# Patient Record
Sex: FEMALE | Race: WHITE | NOT HISPANIC OR LATINO | URBAN - METROPOLITAN AREA
[De-identification: names, ages, dates, MRNs, and addresses within clinical notes are randomized per-mention and may not be internally consistent; named-entity substitution may affect disease eponyms.]

---

## 2023-09-30 PROBLEM — N94.6 DYSMENORRHEA: Status: ACTIVE | Noted: 2023-09-30

## 2023-09-30 PROBLEM — E03.9 HYPOTHYROID: Status: ACTIVE | Noted: 2023-09-30

## 2023-09-30 PROBLEM — N96 HISTORY OF RECURRENT MISCARRIAGES: Status: ACTIVE | Noted: 2023-09-30

## 2023-09-30 PROBLEM — G89.29 CHRONIC ARM PAIN: Status: ACTIVE | Noted: 2023-09-30

## 2023-09-30 PROBLEM — O09.299: Status: ACTIVE | Noted: 2023-09-30

## 2023-09-30 PROBLEM — M79.603 CHRONIC ARM PAIN: Status: ACTIVE | Noted: 2023-09-30

## 2023-09-30 RX ORDER — LEVOTHYROXINE SODIUM 50 UG/1
50 TABLET ORAL DAILY
COMMUNITY
End: 2023-12-01 | Stop reason: SDUPTHER

## 2023-10-04 ENCOUNTER — ALLIED HEALTH (OUTPATIENT)
Dept: INTEGRATIVE MEDICINE | Facility: CLINIC | Age: 38
End: 2023-10-04

## 2023-10-04 PROCEDURE — MASS60G MASSAGE 60 MINUTES: Performed by: MASSAGE THERAPIST

## 2023-10-04 NOTE — PROGRESS NOTES
Massage Therapy Visit:     Elizabeth Barnes was self-referred.    Condition of Client Subjective :  Patient ID: Elizabeth Barnes is a 38 y.o. female who presents for reason for visit of shoulder discomfort and relaxation massage.  HPI    Session Information  Visit Type: Follow-up visit        Review of Systems    Objective        Physical Exam    Actions Assessment/Plan :    Massage Treatment  Patient Position: Table, Supine  Positioning Assistance: Did not need assistance, Pillow(s)/bolster under knees while supine  Massage Technique: Cranio-sacral therapy, Myofascial release, Relaxation massage, Stretching, Therapeutic massage  Area/Body Region: Whole body  Pressure Scale: 4 - Moderate pressure  Action Note: *discomfort indicating to R shoulder, R biceps.  Relaxation massage requested for Stress Management.                                       50 minute full body massage, supine.   Medium/Firm pressure.  Therapeutic pressure. MFR techniques.  Relaxation protocol.    Supine:  Cervical/upper body, kneading, stripping, palming, effleurage, petrissage, cross fiber, on Platysma, SCM, SITS, Levator Scapulae, Trapezius, Rhomboids, upper Pectoralis, Deltoids.  * Traction on cervicals, rhomboids, trapezius.                               UE, stretching, cross fiber, palming, kneading, effleurage, on Deltoids, Biceps, Triceps, forearm muscles, hand muscles.             LE, kneading, stripping, palming, knuckle, effleurage, petrissage, cross fiber, stretching, on Vastus group, TFL, lateral approach to Hip Flexors, Hamstrings, Sartorius, Gracilis, Gastrocnemius, Soleus, Tibialis, plantar/dorsal aspect of foot.                             Lateral approach while supine, kneading, stripping, palming, knuckle, effleurage, petrissage, cross fiber, stretching, on erectors, Longissimus, QL, Oblique, paraspinals.                                                                                                                                        Follow up scheduled, Increase hydration.    Response:       Evaluation:

## 2023-10-12 ENCOUNTER — ALLIED HEALTH (OUTPATIENT)
Dept: INTEGRATIVE MEDICINE | Facility: CLINIC | Age: 38
End: 2023-10-12
Payer: COMMERCIAL

## 2023-10-12 DIAGNOSIS — M54.2 NECK PAIN: ICD-10-CM

## 2023-10-12 DIAGNOSIS — M79.601 CHRONIC PAIN OF RIGHT UPPER EXTREMITY: Primary | ICD-10-CM

## 2023-10-12 DIAGNOSIS — G89.29 CHRONIC PAIN OF RIGHT UPPER EXTREMITY: Primary | ICD-10-CM

## 2023-10-12 PROCEDURE — 97811 ACUP 1/> W/O ESTIM EA ADD 15: CPT | Performed by: ACUPUNCTURIST

## 2023-10-12 PROCEDURE — 97810 ACUP 1/> WO ESTIM 1ST 15 MIN: CPT | Performed by: ACUPUNCTURIST

## 2023-10-12 NOTE — PROGRESS NOTES
Acupuncture Visit:     Subjective   Patient ID: Elizabeth Barnes is a 38 y.o. female who presents for Shoulder Pain, Neck Pain, Stress, Poor Sleep, and Digestive Complaints  Stress has been high this week and sleep has been limited due to baby waking so she is not feeling well.     Chronic right shoulder pain:  she is getting increased shoulder pain in the delotoid.  She gets sharp pain.  Perhaps from holding the baby more. Not the same type of pain that was in the bicep tendon in the past.     neck pain: neck pain has been improving.  Feels tight from stress but no pain.   headaches: no headaches.         Session Information  Is this acupuncture treatment being billed to the patient's insurance company: Yes  Name of Insurance Company: [employer]  Visit Type: Follow-up visit  Medical History Reviewed: I have reviewed pertinent medical history in EHR, and no contraindications are present to provide treatment         Review of Systems  Sleep: not getting enoguh sleep due to baby waking often. She is really tired.  Digestion: she is going back and forth with loose stool       Provider reviewed plan for the acupuncture session, precautions and contraindications. Patient/guardian/hospital staff has given consent to treat with full understanding of what to expect during the session. Before acupuncture began, provider explained to the patient to communicate at any time if the procedure was causing discomfort past their tolerance level. Patient agreed to advise acupuncturist. The acupuncturist counseled the patient on the risks of acupuncture treatment including pain, infection, bleeding, and no relief of pain. The patient was positioned comfortably. There was no evidence of infection at the site of needle insertions.    Objective   Physical Exam         Treatment Plan  Pattern Differentiation: Liver Qi stagnation with qi and blood stasis    Acupuncture Treatment  Patient Position: Supine on a table  Acupuncture Needling:  Yes  Needle Guage: 36 guage /.20/ Blue seirin  Body Points: With retention  Body Points - Bilateral: Du 20, yintang, SP 15, ST 25, R 6, R 12, ST 36, SP 6, KD 3, DANIAL 3  Body Points - Right: LI 15, SJ 14  Auricular Points: Yes  Auricular Points - Bilateral: zero  Other Techniques Utilized: TDP Lamp, Ear seeds  Ear Seeds: Stainless steel (shenmen)  TDP Lamp Descripton: feet and abdomen  Needle Count In: 20  Needle Count Out: 20  Needle Retention Time (min): 25 minutes  Total Face to Face Time (min): 25 minutes              Assessment/Plan   Diagnoses and all orders for this visit:  Chronic pain of right upper extremity  Neck pain

## 2023-10-19 ENCOUNTER — ALLIED HEALTH (OUTPATIENT)
Dept: INTEGRATIVE MEDICINE | Facility: CLINIC | Age: 38
End: 2023-10-19
Payer: COMMERCIAL

## 2023-10-19 DIAGNOSIS — M54.2 NECK PAIN: Primary | ICD-10-CM

## 2023-10-19 DIAGNOSIS — G89.29 CHRONIC PAIN OF RIGHT UPPER EXTREMITY: ICD-10-CM

## 2023-10-19 DIAGNOSIS — M79.601 CHRONIC PAIN OF RIGHT UPPER EXTREMITY: ICD-10-CM

## 2023-10-19 PROCEDURE — 97810 ACUP 1/> WO ESTIM 1ST 15 MIN: CPT | Performed by: ACUPUNCTURIST

## 2023-10-19 PROCEDURE — 97811 ACUP 1/> W/O ESTIM EA ADD 15: CPT | Performed by: ACUPUNCTURIST

## 2023-10-19 NOTE — PROGRESS NOTES
Acupuncture Visit:     Subjective   Patient ID: Elizabeth Barnes is a 38 y.o. female who presents for Neck Pain, Shoulder Pain, and Stress    Stress remains high but she is working on managing her stress better.    Chronic right shoulder pain:  she is not using her shoulder as much and doing a little better with rest.  She has been feeling chest tightness and that affects her shoulder pain in a negative way.     neck pain: neck is continuing to improve and doing well.   headaches: no headaches.         Session Information  Is this acupuncture treatment being billed to the patient's insurance company: Yes  Name of Insurance Company: [employer]  Visit Type: Follow-up visit  Medical History Reviewed: I have reviewed pertinent medical history in EHR, and no contraindications are present to provide treatment         Review of Systems  Sleep: still struggling with baby's sleep and this causes poor sleep for her  Digestion: less constipation  Stress: high       Provider reviewed plan for the acupuncture session, precautions and contraindications. Patient/guardian/hospital staff has given consent to treat with full understanding of what to expect during the session. Before acupuncture began, provider explained to the patient to communicate at any time if the procedure was causing discomfort past their tolerance level. Patient agreed to advise acupuncturist. The acupuncturist counseled the patient on the risks of acupuncture treatment including pain, infection, bleeding, and no relief of pain. The patient was positioned comfortably. There was no evidence of infection at the site of needle insertions.    Objective   Physical Exam              Acupuncture Treatment  Patient Position: Supine on a table  Acupuncture Needling: Yes  Needle Guage: 36 guage /.20/ Blue seirin  Body Points: With retention  Body Points - Bilateral: Du 20, yintang, LI 4, SJ 5, R 12, SP 15, R 6, KD 25, FRED 1, ST 36, SP 6, DANIAL 3  Auricular Points:  Yes  Auricular Points - Bilateral: zero  Other Techniques Utilized: Ear seeds, TDP Lamp  Ear Seeds: Stainless steel (shenmen)  TDP Lamp Descripton: feet and abdomen  Needle Count In: 22  Needle Count Out: 22  Needle Retention Time (min): 25 minutes  Total Face to Face Time (min): 25 minutes              Assessment/Plan   Diagnoses and all orders for this visit:  Neck pain  Chronic pain of right upper extremity

## 2023-10-27 ENCOUNTER — APPOINTMENT (OUTPATIENT)
Dept: INTEGRATIVE MEDICINE | Facility: CLINIC | Age: 38
End: 2023-10-27
Payer: COMMERCIAL

## 2023-11-03 ENCOUNTER — ALLIED HEALTH (OUTPATIENT)
Dept: INTEGRATIVE MEDICINE | Facility: CLINIC | Age: 38
End: 2023-11-03
Payer: COMMERCIAL

## 2023-11-03 DIAGNOSIS — M79.601 CHRONIC PAIN OF RIGHT UPPER EXTREMITY: ICD-10-CM

## 2023-11-03 DIAGNOSIS — M54.2 NECK PAIN: Primary | ICD-10-CM

## 2023-11-03 DIAGNOSIS — G89.29 CHRONIC PAIN OF RIGHT UPPER EXTREMITY: ICD-10-CM

## 2023-11-03 PROCEDURE — 97811 ACUP 1/> W/O ESTIM EA ADD 15: CPT | Performed by: ACUPUNCTURIST

## 2023-11-03 PROCEDURE — 97810 ACUP 1/> WO ESTIM 1ST 15 MIN: CPT | Performed by: ACUPUNCTURIST

## 2023-11-03 NOTE — PROGRESS NOTES
Acupuncture Visit:     Subjective   Patient ID: Elizabeth Barnes is a 38 y.o. female who presents for Neck Pain, Headache, Shoulder Pain, and Stress    Chronic right shoulder pain:  continuing to work on stretching her chest muscles.  This seems to help the shoulder.  She is having some pain in the right shoulder and arm.     neck pain: good this week. No issues.   headaches: no headaches.     LMP 11/2/23  First period since baby.  Some cramping and really painful ovulation this month.  Bleeding is moderate.  PMS: no noticeable symptoms    Stress moderate        Session Information  Is this acupuncture treatment being billed to the patient's insurance company: Yes  Last Treatment date: 10/19/23  Name of Insurance Company: [employer]  Visit Type: Follow-up visit  Medical History Reviewed: I have reviewed pertinent medical history in EHR, and no contraindications are present to provide treatment         Review of Systems  Sleep: still struggling with baby's sleep and this causes poor sleep for her  Digestion: better  Stress: high       Provider reviewed plan for the acupuncture session, precautions and contraindications. Patient/guardian/hospital staff has given consent to treat with full understanding of what to expect during the session. Before acupuncture began, provider explained to the patient to communicate at any time if the procedure was causing discomfort past their tolerance level. Patient agreed to advise acupuncturist. The acupuncturist counseled the patient on the risks of acupuncture treatment including pain, infection, bleeding, and no relief of pain. The patient was positioned comfortably. There was no evidence of infection at the site of needle insertions.    Objective   Physical Exam              Acupuncture Treatment  Patient Position: Supine on a table  Acupuncture Needling: Yes  Needle Guage: 36 guage /.20/ Blue seirin  Body Points: With retention  Body Points - Bilateral: DU 20, P 6, GB 21, FRED 1, R  12, ST 25, R 6, zigong, ST 36, Sp 6, DANIAL 3  Body Points - Right: LI 14, SJ 14, LI 15  Auricular Points: No  Electroacupuncture Used: No  Other Techniques Utilized: Ear seeds, TDP Lamp  Ear Seeds: Stainless steel (shenmen)  TDP Lamp Descripton: feet and abdomen  Needle Count In: 22  Needle Count Out: 22  Needle Retention Time (min): 25 minutes  Total Face to Face Time (min): 25 minutes              Assessment/Plan   Diagnoses and all orders for this visit:  Neck pain  Chronic pain of right upper extremity

## 2023-11-10 ENCOUNTER — ALLIED HEALTH (OUTPATIENT)
Dept: INTEGRATIVE MEDICINE | Facility: CLINIC | Age: 38
End: 2023-11-10
Payer: COMMERCIAL

## 2023-11-10 DIAGNOSIS — M79.601 CHRONIC PAIN OF RIGHT UPPER EXTREMITY: ICD-10-CM

## 2023-11-10 DIAGNOSIS — G89.29 CHRONIC PAIN OF RIGHT UPPER EXTREMITY: ICD-10-CM

## 2023-11-10 DIAGNOSIS — M54.2 NECK PAIN: Primary | ICD-10-CM

## 2023-11-10 PROCEDURE — 97810 ACUP 1/> WO ESTIM 1ST 15 MIN: CPT | Performed by: ACUPUNCTURIST

## 2023-11-10 PROCEDURE — 97811 ACUP 1/> W/O ESTIM EA ADD 15: CPT | Performed by: ACUPUNCTURIST

## 2023-11-17 ENCOUNTER — ALLIED HEALTH (OUTPATIENT)
Dept: INTEGRATIVE MEDICINE | Facility: CLINIC | Age: 38
End: 2023-11-17
Payer: COMMERCIAL

## 2023-11-17 DIAGNOSIS — M54.2 NECK PAIN: Primary | ICD-10-CM

## 2023-11-17 DIAGNOSIS — M79.601 CHRONIC PAIN OF RIGHT UPPER EXTREMITY: ICD-10-CM

## 2023-11-17 DIAGNOSIS — G89.29 CHRONIC PAIN OF RIGHT UPPER EXTREMITY: ICD-10-CM

## 2023-11-17 PROCEDURE — 97810 ACUP 1/> WO ESTIM 1ST 15 MIN: CPT | Performed by: ACUPUNCTURIST

## 2023-11-17 PROCEDURE — 97811 ACUP 1/> W/O ESTIM EA ADD 15: CPT | Performed by: ACUPUNCTURIST

## 2023-11-17 NOTE — PROGRESS NOTES
Acupuncture Visit:     Subjective   Patient ID: Elizabeth Barnes is a 38 y.o. female who presents for Neck Pain, Shoulder Pain, and Headache    Chronic right shoulder pain:  she is not using her shoulder as much and doing a little better with rest.  She has been feeling chest tightness and that affects her shoulder pain in a negative way.  She is working on stretching.    neck pain: good. Pain is under control.  headaches: no headaches.           Session Information  Is this acupuncture treatment being billed to the patient's insurance company: Yes  Last Treatment date: 11/10/23  Name of Insurance Company: [employer]  Visit Type: Follow-up visit  Medical History Reviewed: I have reviewed pertinent medical history in EHR, and no contraindications are present to provide treatment         Review of Systems  Sleep: she slept well this week.   Digestion: better  Stress: moderate overall.  Acupuncture helps significantly with her anxiety  LMP 11/2/23       Provider reviewed plan for the acupuncture session, precautions and contraindications. Patient/guardian/hospital staff has given consent to treat with full understanding of what to expect during the session. Before acupuncture began, provider explained to the patient to communicate at any time if the procedure was causing discomfort past their tolerance level. Patient agreed to advise acupuncturist. The acupuncturist counseled the patient on the risks of acupuncture treatment including pain, infection, bleeding, and no relief of pain. The patient was positioned comfortably. There was no evidence of infection at the site of needle insertions.    Objective   Physical Exam              Acupuncture Treatment  Patient Position: Supine on a table  Acupuncture Needling: Yes  Needle Guage: 36 guage /.20/ Blue seirin  Body Points: With retention  Body Points - Bilateral: Du 20, LI 4, R 12, ST 25, R 6, ST 36, SP 6, KD 3, FRED 1  Body Points - Right: SJ 14, LI 15, LI 14  Auricular  Points: No  Other Techniques Utilized: Ear seeds, TDP Lamp  Ear Seeds: Stainless steel (shenmen)  TDP Lamp Descripton: feet and abdomen  Needle Count In: 18  Needle Count Out: 18  Needle Retention Time (min): 25 minutes  Total Face to Face Time (min): 25 minutes              Assessment/Plan   Diagnoses and all orders for this visit:  Neck pain  Chronic pain of right upper extremity

## 2023-11-24 ENCOUNTER — PATIENT MESSAGE (OUTPATIENT)
Dept: ENDOCRINOLOGY | Facility: CLINIC | Age: 38
End: 2023-11-24
Payer: COMMERCIAL

## 2023-11-24 DIAGNOSIS — E03.9 HYPOTHYROIDISM, UNSPECIFIED TYPE: Primary | ICD-10-CM

## 2023-12-01 ENCOUNTER — ALLIED HEALTH (OUTPATIENT)
Dept: INTEGRATIVE MEDICINE | Facility: CLINIC | Age: 38
End: 2023-12-01
Payer: COMMERCIAL

## 2023-12-01 DIAGNOSIS — J06.9 VIRAL UPPER RESPIRATORY TRACT INFECTION: ICD-10-CM

## 2023-12-01 DIAGNOSIS — M54.2 NECK PAIN: Primary | ICD-10-CM

## 2023-12-01 DIAGNOSIS — M79.601 CHRONIC PAIN OF RIGHT UPPER EXTREMITY: ICD-10-CM

## 2023-12-01 DIAGNOSIS — G89.29 CHRONIC PAIN OF RIGHT UPPER EXTREMITY: ICD-10-CM

## 2023-12-01 PROCEDURE — 97810 ACUP 1/> WO ESTIM 1ST 15 MIN: CPT | Performed by: ACUPUNCTURIST

## 2023-12-01 PROCEDURE — 97811 ACUP 1/> W/O ESTIM EA ADD 15: CPT | Performed by: ACUPUNCTURIST

## 2023-12-01 RX ORDER — LEVOTHYROXINE SODIUM 50 UG/1
TABLET ORAL
Qty: 105 TABLET | Refills: 3 | Status: SHIPPED | OUTPATIENT
Start: 2023-12-01

## 2023-12-01 NOTE — PROGRESS NOTES
Acupuncture Visit:     Subjective   Patient ID: Elizabeth Barnes is a 38 y.o. female who presents for Shoulder Pain, Neck Pain, Hip Pain, and URI    Chronic right shoulder pain:  she has been resting this week with a URI and not using her shoulder as much.  She is achy in general from the illness and notices this pain in the right shoulder and neck    Neck pain: she is very sore in her neck this week.  Increased pain brought on by her URI.    URI: post nasal drip, mild cough, body aches, no fever, negative COVID test. Onset two days ago.  It has been getting a little worse.  No headaches but body aches all over.     Cardiology appointment: EKG was normal and she retested thyroid and her TSH was 4.  She is scheduled for an ECHO at the end of the month and she is increasing her thyroid medication and they will test her thyroid antibodies.        Session Information  Is this acupuncture treatment being billed to the patient's insurance company: Yes  Last Treatment date: 11/17/23  Name of Insurance Company: [employer]  Visit Type: Follow-up visit  Medical History Reviewed: I have reviewed pertinent medical history in EHR, and no contraindications are present to provide treatment         Review of Systems  Sleep: not as good due to her and her son being sick  Digestion: doing well.   Stress: manageable.  Gyne:  11/2/23  LMP 11/28/23  She had some cramping but it was okay and flow was normal.        Provider reviewed plan for the acupuncture session, precautions and contraindications. Patient/guardian/hospital staff has given consent to treat with full understanding of what to expect during the session. Before acupuncture began, provider explained to the patient to communicate at any time if the procedure was causing discomfort past their tolerance level. Patient agreed to advise acupuncturist. The acupuncturist counseled the patient on the risks of acupuncture treatment including pain, infection, bleeding, and no relief  of pain. The patient was positioned comfortably. There was no evidence of infection at the site of needle insertions.    Objective   Physical Exam              Acupuncture Treatment  Patient Position: Supine on a table  Acupuncture Needling: Yes  Needle Guage: 36 guage /.20/ Blue seirin, 40 guage /.16/ Red seirin  Body Points: With retention  Body Points - Bilateral: Du 23, yintang, GB 21, LI 4, FRED 7, SJ 5, FRED 1, ST 36, SP 9, ST 40  Other Techniques Utilized: Ear seeds, TDP Lamp  Ear Seeds: Stainless steel (shenmen)  TDP Lamp Descripton: feet  Needle Count In: 18  Needle Count Out: 18  Needle Retention Time (min): 25 minutes  Total Face to Face Time (min): 25 minutes              Assessment/Plan   Diagnoses and all orders for this visit:  Neck pain  Chronic pain of right upper extremity  Viral upper respiratory tract infection       (2) very limited

## 2023-12-10 DIAGNOSIS — E03.9 HYPOTHYROIDISM, UNSPECIFIED TYPE: ICD-10-CM

## 2023-12-12 RX ORDER — LEVOTHYROXINE SODIUM 50 UG/1
TABLET ORAL
Qty: 90 TABLET | OUTPATIENT
Start: 2023-12-12

## 2023-12-14 NOTE — PROGRESS NOTES
Acupuncture Visit:     Subjective   Patient ID: Elizabeth Barnes is a 38 y.o. female who presents for Shoulder Pain, Neck Pain, and Headache    Chronic right shoulder pain: pain has been mild in the right shoulder and arm this week. Continuing to work on stretching and strength.     neck pain: increased pain in her neck and upper back this week with tightness  headaches: no headaches.         Session Information  Is this acupuncture treatment being billed to the patient's insurance company: Yes  Last Treatment date: 11/03/23  Name of Insurance Company: [employer]  Visit Type: Follow-up visit  Medical History Reviewed: I have reviewed pertinent medical history in EHR, and no contraindications are present to provide treatment         Review of Systems  Sleep: still struggling with baby's sleep and this causes poor sleep for her  Digestion: good  Stress: moderate  Gyne: LMP 11/2/23         Provider reviewed plan for the acupuncture session, precautions and contraindications. Patient/guardian/hospital staff has given consent to treat with full understanding of what to expect during the session. Before acupuncture began, provider explained to the patient to communicate at any time if the procedure was causing discomfort past their tolerance level. Patient agreed to advise acupuncturist. The acupuncturist counseled the patient on the risks of acupuncture treatment including pain, infection, bleeding, and no relief of pain. The patient was positioned comfortably. There was no evidence of infection at the site of needle insertions.    Objective   Physical Exam              Acupuncture Treatment  Patient Position: Prone on a table  Acupuncture Needling: Yes  Needle Guage: 36 guage /.20/ Blue seirin  Body Points: With retention  Body Points - Bilateral: GB 20, UB 10, GB 21, rhomboid nikos, UB 18, UB 20, UB 23, P 6, GB 34, SP 6  Auricular Points: No  Other Techniques Utilized: Ear seeds, TDP Lamp, Cupping  Cupping Description:  running cupping on upper back  Ear Seeds: Stainless steel (shenmen)  TDP Lamp Descripton: upper back  Needle Count In: 20  Needle Count Out: 20  Needle Retention Time (min): 25 minutes  Total Face to Face Time (min): 25 minutes              Assessment/Plan   Diagnoses and all orders for this visit:  Neck pain  Chronic pain of right upper extremity

## 2023-12-15 ENCOUNTER — ALLIED HEALTH (OUTPATIENT)
Dept: INTEGRATIVE MEDICINE | Facility: CLINIC | Age: 38
End: 2023-12-15
Payer: COMMERCIAL

## 2023-12-15 DIAGNOSIS — M79.601 CHRONIC PAIN OF RIGHT UPPER EXTREMITY: ICD-10-CM

## 2023-12-15 DIAGNOSIS — M54.2 NECK PAIN: Primary | ICD-10-CM

## 2023-12-15 DIAGNOSIS — G89.29 CHRONIC PAIN OF RIGHT UPPER EXTREMITY: ICD-10-CM

## 2023-12-15 DIAGNOSIS — J06.9 VIRAL UPPER RESPIRATORY TRACT INFECTION: ICD-10-CM

## 2023-12-15 DIAGNOSIS — M79.641 RIGHT HAND PAIN: ICD-10-CM

## 2023-12-15 PROCEDURE — 97810 ACUP 1/> WO ESTIM 1ST 15 MIN: CPT | Performed by: ACUPUNCTURIST

## 2023-12-15 PROCEDURE — 97811 ACUP 1/> W/O ESTIM EA ADD 15: CPT | Performed by: ACUPUNCTURIST

## 2023-12-15 NOTE — PROGRESS NOTES
Acupuncture Visit:     Subjective   Patient ID: Elizabeth Barnes is a 38 y.o. female who presents for Neck Pain, Shoulder Pain, Hand Pain, and URI    Chronic right shoulder pain:  she has been sick with a URI for several weeks and resting during that time so she is having less pain overall in her chronic shoulder pain.  Mild soreness with certain movements.     (R) finger pain: she smashed her 2nd, 3rd, and 4th distal part of her fingers on the right hand in a garage door last week.  She had significant pain, swelling and bruising.  She went to the ER and there was no bone break.  She is feeling strange nerve sensations and mild pain and mild swelling now.     Neck pain: continuing to see improvement in the neck pain and tension.  She thinks rest has been helpful.     URI: she has a new URI with stuffy nose, no sore throat, residual cough from last cold, no headaches, no fever, no body aches.     Cardiology appointment: EKG was normal and she retested thyroid and her TSH was 4.  She is scheduled for an ECHO at the end of the month and she is increasing her thyroid medication and they will test her thyroid antibodies. ECHO on 12/28.     Breastfeeding: pumping 3 times per day and breastmilk is going down.          Session Information  Is this acupuncture treatment being billed to the patient's insurance company: Yes  Last Treatment date: 12/01/23  Name of Insurance Company: [employer]  Visit Type: Follow-up visit  Medical History Reviewed: I have reviewed pertinent medical history in EHR, and no contraindications are present to provide treatment         Review of Systems  Sleep: not great.   Digestion: doing well.   Stress: higher.  Gyne:  11/2/23  LMP 11/28/23 nothing yet.        Provider reviewed plan for the acupuncture session, precautions and contraindications. Patient/guardian/hospital staff has given consent to treat with full understanding of what to expect during the session. Before acupuncture began,  provider explained to the patient to communicate at any time if the procedure was causing discomfort past their tolerance level. Patient agreed to advise acupuncturist. The acupuncturist counseled the patient on the risks of acupuncture treatment including pain, infection, bleeding, and no relief of pain. The patient was positioned comfortably. There was no evidence of infection at the site of needle insertions.    Objective   Physical Exam              Acupuncture Treatment  Patient Position: Supine on a table  Acupuncture Needling: Yes  Needle Guage: 34 guage /.22/ Pink seirin, 40 guage /.16/ Red seirin  Body Points: With retention  Body Points - Bilateral: Du 23, yintang, LI 20, LI 4, SJ 5, FRED 7, ST 36, SP 9, ST 40  Body Points - Right: baxie, LI 14, SJ 14, LI 15  Auricular Points: No  Other Techniques Utilized: Ear seeds, TDP Lamp  Ear Seeds: Stainless steel (shenmen)  TDP Lamp Descripton: feet  Needle Count In: 22  Needle Count Out: 22  Needle Retention Time (min): 25 minutes  Total Face to Face Time (min): 25 minutes              Assessment/Plan   Diagnoses and all orders for this visit:  Neck pain  Chronic pain of right upper extremity  Viral upper respiratory tract infection  Right hand pain

## 2023-12-22 ENCOUNTER — ALLIED HEALTH (OUTPATIENT)
Dept: INTEGRATIVE MEDICINE | Facility: CLINIC | Age: 38
End: 2023-12-22
Payer: COMMERCIAL

## 2023-12-22 DIAGNOSIS — M79.641 RIGHT HAND PAIN: ICD-10-CM

## 2023-12-22 DIAGNOSIS — M54.2 NECK PAIN: Primary | ICD-10-CM

## 2023-12-22 DIAGNOSIS — J06.9 VIRAL UPPER RESPIRATORY TRACT INFECTION: ICD-10-CM

## 2023-12-22 DIAGNOSIS — M79.601 CHRONIC PAIN OF RIGHT UPPER EXTREMITY: ICD-10-CM

## 2023-12-22 DIAGNOSIS — G89.29 CHRONIC PAIN OF RIGHT UPPER EXTREMITY: ICD-10-CM

## 2023-12-22 PROCEDURE — 97811 ACUP 1/> W/O ESTIM EA ADD 15: CPT | Performed by: ACUPUNCTURIST

## 2023-12-22 PROCEDURE — 97810 ACUP 1/> WO ESTIM 1ST 15 MIN: CPT | Performed by: ACUPUNCTURIST

## 2023-12-22 NOTE — PROGRESS NOTES
Acupuncture Visit:     Subjective   Patient ID: Elizabeth Barnes is a 38 y.o. female who presents for Shoulder Pain, Hand Pain, and Neck Pain    Chronic right shoulder pain:  sore with certain movements but overall doing well because she has been resting with her cold.     (R) finger pain: she smashed her 2nd, 3rd, and 4th distal part of her fingers on the right hand in a garage door 2 weeks ago.  Healing well.  Getting feeling back and pain is decreasing.     Neck pain: some soreness and tightness in the neck and upper back because she 8 month old daughter is needing more holding and rocking right now.     URI: resolving URI with no fatigue or body aches but still some sinus congestion and post nasal drip.    Cardiology appointment: EKG was normal and she retested thyroid and her TSH was 4.  She is scheduled for an ECHO at the end of the month and she is increasing her thyroid medication and they will test her thyroid antibodies. ECHO on 12/28.     Breastfeeding: pumping 3 times per day and breastmilk is going down.          Session Information  Is this acupuncture treatment being billed to the patient's insurance company: Yes  Last Treatment date: 12/15/23  Name of Insurance Company: [employer]  Visit Type: Follow-up visit  Medical History Reviewed: I have reviewed pertinent medical history in EHR, and no contraindications are present to provide treatment         Review of Systems  Sleep: not great due to little ones  Digestion: doing well.   Stress: high but getting ready for holiday break  Gyne:  11/2/23  LMP 11/28/23 nothing yet.        Provider reviewed plan for the acupuncture session, precautions and contraindications. Patient/guardian/hospital staff has given consent to treat with full understanding of what to expect during the session. Before acupuncture began, provider explained to the patient to communicate at any time if the procedure was causing discomfort past their tolerance level. Patient agreed to  advise acupuncturist. The acupuncturist counseled the patient on the risks of acupuncture treatment including pain, infection, bleeding, and no relief of pain. The patient was positioned comfortably. There was no evidence of infection at the site of needle insertions.    Objective   Physical Exam              Acupuncture Treatment  Patient Position: Supine on a table  Acupuncture Needling: Yes  Needle Guage: 34 guage /.22/ Pink seirin, 40 guage /.16/ Red seirin  Body Points: With retention  Body Points - Bilateral: Du 20, yintang, LI 20, GB 21, LI 4, SJ 5, LI 11, St 36, SP 9, ST 40, Sp 6, KD 3 (No Liver 3)  Auricular Points: No  Other Techniques Utilized: Ear seeds, TDP Lamp  Ear Seeds: Stainless steel (shenmen)  TDP Lamp Descripton: feet  Needle Count In: 22  Needle Count Out: 22  Needle Retention Time (min): 25 minutes  Total Face to Face Time (min): 25 minutes              Assessment/Plan   Diagnoses and all orders for this visit:  Neck pain  Chronic pain of right upper extremity  Right hand pain  Viral upper respiratory tract infection

## 2024-01-09 ENCOUNTER — APPOINTMENT (OUTPATIENT)
Dept: INTEGRATIVE MEDICINE | Facility: CLINIC | Age: 39
End: 2024-01-09
Payer: COMMERCIAL

## 2024-01-19 ENCOUNTER — ALLIED HEALTH (OUTPATIENT)
Dept: INTEGRATIVE MEDICINE | Facility: CLINIC | Age: 39
End: 2024-01-19
Payer: COMMERCIAL

## 2024-01-19 DIAGNOSIS — G89.29 CHRONIC PAIN OF RIGHT UPPER EXTREMITY: ICD-10-CM

## 2024-01-19 DIAGNOSIS — M54.2 NECK PAIN: Primary | ICD-10-CM

## 2024-01-19 DIAGNOSIS — M79.601 CHRONIC PAIN OF RIGHT UPPER EXTREMITY: ICD-10-CM

## 2024-01-19 PROCEDURE — 97810 ACUP 1/> WO ESTIM 1ST 15 MIN: CPT | Performed by: ACUPUNCTURIST

## 2024-01-19 PROCEDURE — 97811 ACUP 1/> W/O ESTIM EA ADD 15: CPT | Performed by: ACUPUNCTURIST

## 2024-01-26 ENCOUNTER — ALLIED HEALTH (OUTPATIENT)
Dept: INTEGRATIVE MEDICINE | Facility: CLINIC | Age: 39
End: 2024-01-26
Payer: COMMERCIAL

## 2024-01-26 DIAGNOSIS — M79.601 CHRONIC PAIN OF RIGHT UPPER EXTREMITY: ICD-10-CM

## 2024-01-26 DIAGNOSIS — G89.29 CHRONIC PAIN OF RIGHT UPPER EXTREMITY: ICD-10-CM

## 2024-01-26 DIAGNOSIS — M54.9 UPPER BACK PAIN ON RIGHT SIDE: ICD-10-CM

## 2024-01-26 DIAGNOSIS — M54.2 NECK PAIN: Primary | ICD-10-CM

## 2024-01-26 PROCEDURE — 97811 ACUP 1/> W/O ESTIM EA ADD 15: CPT | Performed by: ACUPUNCTURIST

## 2024-01-26 PROCEDURE — 97810 ACUP 1/> WO ESTIM 1ST 15 MIN: CPT | Performed by: ACUPUNCTURIST

## 2024-01-26 NOTE — PROGRESS NOTES
Acupuncture Visit:     Subjective   Patient ID: Elizabeth Barnes is a 39 y.o. female who presents for Neck Pain and Back Pain    Neck pain and Chronic right shoulder/upper back pain: still having a lot of pain in the right side of her neck and trap.  Pain is above the right scapula.     (R) finger pain: still tenderness but no nerve issues.     Cardiology appointment: EKG was normal and ECHO was normal results. She needs to talk with her doctor about the results.     Breastfeeding: pumping twice per day and supply low.         Session Information  Is this acupuncture treatment being billed to the patient's insurance company: Yes  Last Treatment date: 01/19/24  Name of Insurance Company: [place]  Visit Type: Follow-up visit  Medical History Reviewed: I have reviewed pertinent medical history in EHR, and no contraindications are present to provide treatment         Review of Systems  Sleep: better after acupuncture  Digestion: doing good.   Stress: moderate now.   Gyne: LMP 1/17/24 Doing well now.        Provider reviewed plan for the acupuncture session, precautions and contraindications. Patient/guardian/hospital staff has given consent to treat with full understanding of what to expect during the session. Before acupuncture began, provider explained to the patient to communicate at any time if the procedure was causing discomfort past their tolerance level. Patient agreed to advise acupuncturist. The acupuncturist counseled the patient on the risks of acupuncture treatment including pain, infection, bleeding, and no relief of pain. The patient was positioned comfortably. There was no evidence of infection at the site of needle insertions.    Objective   Physical Exam              Acupuncture Treatment  Patient Position: Prone on a table  Acupuncture Needling: Yes  Needle Guage: 36 guage /.20/ Blue seirin  Body Points: With retention  Body Points - Bilateral: GB 20, UB 10, GB 21, rhomboid nikos x3, GB 34  Body Points  - Right: SI 11, SJ 15, SI 13  Auricular Points: No  Electroacupuncture Used: No  Other Techniques Utilized: Ear seeds, TDP Lamp, Cupping  Cupping Description: running cupping on upper back with massage oil  Ear Seeds: Stainless steel (shenmen)  TDP Lamp Descripton: feet  Needle Count In: 17  Needle Count Out: 17  Needle Retention Time (min): 25 minutes  Total Face to Face Time (min): 25 minutes              Assessment/Plan   Diagnoses and all orders for this visit:  Neck pain  Chronic pain of right upper extremity  Upper back pain on right side

## 2024-01-30 ENCOUNTER — ALLIED HEALTH (OUTPATIENT)
Dept: INTEGRATIVE MEDICINE | Facility: CLINIC | Age: 39
End: 2024-01-30

## 2024-01-30 PROCEDURE — MASSG60 MASSAGE 60 MINUTES: Performed by: MASSAGE THERAPIST

## 2024-01-30 PROCEDURE — CYTAX SALES TAX CUYAHOGA COUNT: Performed by: MASSAGE THERAPIST

## 2024-01-30 NOTE — PROGRESS NOTES
Massage Therapy Visit:     Elizabeth Barnes was self-referred.    Condition of Client Subjective :  Patient ID: Elizabeth Barnes is a 39 y.o. female who presents for reason for visit of Muscle tension relief.  HPI    Session Information  Visit Type: Follow-up visit  Description of present complaint: Muscle tension, Discomfort, Stress, Range of motion (ROM)    Review of Systems    Objective   Pre-treatment Assessment  Condition of Client Note: Requests focus on neck, shoulder.    Physical Exam    Actions Assessment/Plan :    Massage Treatment  Patient Position: Table, Supine, Prone  Positioning Assistance: Did not need assistance, Pillow(s)/bolster under anles while prone, Pillow(s)/bolster under knees while supine  Massage Technique: Myofascial release, Nurturing touch, Mobilization, Stretching, Therapeutic massage, Relaxation massage  Pressure Scale: 2 - Mild pressure, 3 - Medium pressure, 4 - Moderate pressure  Action Note: 50 minute full body massage, supine/prone.  /Medium/Firm pressure.  Therapeutic pressure. MFR techniques.  Relaxation protocol.    Supine:  Cervical/upper body, kneading, stripping, palming, effleurage, petrissage, cross fiber, on SCM, SITS, Levator Scapulae, Trapezius, Rhomboids, upper Pectoralis, Deltoids.  UE, stretching, cross fiber, palming, kneading, effleurage, on Deltoids, Biceps, Triceps, forearm muscles, hand muscles.  LE, kneading, stripping, palming, knuckle, effleurage, petrissage, cross fiber, stretching, on Vastus group, TFL, lateral approach to Hip Flexors, Hamstrings, Sartorius, Gracilis, Gastrocnemius, Soleus, Tibialis, plantar/dorsal aspect of foot.  Prone, kneading, stripping, palming, knuckle, effleurage, petrissage, cross fiber, stretching, on SI joint, erectors, Longissimus, QL, Oblique, paraspinals, SITS.    Response:  Post-treatment Assessment  Patient Fell Asleep During Treatment: No  Patient Noted Improvement of the Following Symptoms: Muscle tension,  ROM    Evaluation:   Massage Therapy Evaluation / Recommendation(s) / Follow-up  Post-Treatment Recommendation: Increase hydration  Follow-up: Follow up as needed.

## 2024-02-02 ENCOUNTER — APPOINTMENT (OUTPATIENT)
Dept: INTEGRATIVE MEDICINE | Facility: CLINIC | Age: 39
End: 2024-02-02
Payer: COMMERCIAL

## 2024-02-08 ENCOUNTER — ALLIED HEALTH (OUTPATIENT)
Dept: INTEGRATIVE MEDICINE | Facility: CLINIC | Age: 39
End: 2024-02-08
Payer: COMMERCIAL

## 2024-02-08 DIAGNOSIS — G89.29 CHRONIC PAIN OF RIGHT UPPER EXTREMITY: ICD-10-CM

## 2024-02-08 DIAGNOSIS — M54.2 NECK PAIN: Primary | ICD-10-CM

## 2024-02-08 DIAGNOSIS — M79.601 CHRONIC PAIN OF RIGHT UPPER EXTREMITY: ICD-10-CM

## 2024-02-08 PROCEDURE — 97811 ACUP 1/> W/O ESTIM EA ADD 15: CPT | Performed by: ACUPUNCTURIST

## 2024-02-08 PROCEDURE — 97810 ACUP 1/> WO ESTIM 1ST 15 MIN: CPT | Performed by: ACUPUNCTURIST

## 2024-02-08 NOTE — PROGRESS NOTES
Acupuncture Visit:     Subjective   Patient ID: Elizabeth Barnes is a 39 y.o. female who presents for Neck Pain, Back Pain, Immune Support, and Stress    Her son has been sick with strep all week so she has not been getting as much sleep and caring for him and hoping not to get sick herself.     Neck pain and Chronic right shoulder/upper back pain: tension and pain in the neck and upper back from stress and caring for children.     Cardiology appointment: EKG was normal and ECHO was normal results. She needs to talk with her doctor about the results.     Breastfeeding: pumping twice per day and supply low.         Session Information  Is this acupuncture treatment being billed to the patient's insurance company: Yes  Last Treatment date: 01/26/24  Name of Insurance Company: [employer]  Visit Type: Follow-up visit  Medical History Reviewed: I have reviewed pertinent medical history in EHR, and no contraindications are present to provide treatment         Review of Systems  Sleep: better after acupuncture  Digestion: doing good.   Stress: high now.   Gyne: LMP 1/17/24 Bad ovulation pain.  She is expecting period in about 10 days.        Provider reviewed plan for the acupuncture session, precautions and contraindications. Patient/guardian/hospital staff has given consent to treat with full understanding of what to expect during the session. Before acupuncture began, provider explained to the patient to communicate at any time if the procedure was causing discomfort past their tolerance level. Patient agreed to advise acupuncturist. The acupuncturist counseled the patient on the risks of acupuncture treatment including pain, infection, bleeding, and no relief of pain. The patient was positioned comfortably. There was no evidence of infection at the site of needle insertions.    Objective   Physical Exam              Acupuncture Treatment  Patient Position: Supine on a table  Acupuncture Needling: Yes  Needle Guage: 36  guage /.20/ Blue irin  Body Points: With retention  Body Points - Bilateral: Du 20, yintang, LI 11, SJ 5, ST 36, SP 9, Sp 6, R 12, ST 25, R 6  Auricular Points: No  Electroacupuncture Used: No  Other Techniques Utilized: Ear seeds, TDP Lamp  Ear Seeds: Stainless steel (shenmen)  TDP Lamp Descripton: feet and abdomen  Needle Count In: 16  Needle Count Out: 16  Needle Retention Time (min): 25 minutes  Total Face to Face Time (min): 25 minutes              Assessment/Plan   Diagnoses and all orders for this visit:  Neck pain  Chronic pain of right upper extremity

## 2024-02-09 ENCOUNTER — APPOINTMENT (OUTPATIENT)
Dept: INTEGRATIVE MEDICINE | Facility: CLINIC | Age: 39
End: 2024-02-09
Payer: COMMERCIAL

## 2024-02-15 ENCOUNTER — APPOINTMENT (OUTPATIENT)
Dept: INTEGRATIVE MEDICINE | Facility: CLINIC | Age: 39
End: 2024-02-15
Payer: COMMERCIAL

## 2024-02-16 ENCOUNTER — APPOINTMENT (OUTPATIENT)
Dept: INTEGRATIVE MEDICINE | Facility: CLINIC | Age: 39
End: 2024-02-16
Payer: COMMERCIAL

## 2024-02-23 ENCOUNTER — ALLIED HEALTH (OUTPATIENT)
Dept: INTEGRATIVE MEDICINE | Facility: CLINIC | Age: 39
End: 2024-02-23
Payer: COMMERCIAL

## 2024-02-23 DIAGNOSIS — M54.2 NECK PAIN: Primary | ICD-10-CM

## 2024-02-23 DIAGNOSIS — G89.29 CHRONIC PAIN OF RIGHT UPPER EXTREMITY: ICD-10-CM

## 2024-02-23 DIAGNOSIS — M79.601 CHRONIC PAIN OF RIGHT UPPER EXTREMITY: ICD-10-CM

## 2024-02-23 DIAGNOSIS — J02.9 SORE THROAT: ICD-10-CM

## 2024-02-23 DIAGNOSIS — M54.9 UPPER BACK PAIN ON RIGHT SIDE: ICD-10-CM

## 2024-02-23 PROCEDURE — 97810 ACUP 1/> WO ESTIM 1ST 15 MIN: CPT | Performed by: ACUPUNCTURIST

## 2024-02-23 PROCEDURE — 97811 ACUP 1/> W/O ESTIM EA ADD 15: CPT | Performed by: ACUPUNCTURIST

## 2024-02-23 NOTE — PROGRESS NOTES
Acupuncture Visit:     Subjective   Patient ID: Elizabeth Barnes is a 39 y.o. female who presents for Neck Pain, Shoulder Pain, and Stress    Neck pain and Chronic right shoulder/upper back pain: overall she is doing better with her neck and upper back after her massage recently and working on posture. She would like cupping today.     Sore throat: She still has a sore throat that won't go away for the past two weeks. She has tested negative for strep, covid, and flu.  She feels that things are improving. She also thinks there is some post nasal drip. Congestion is clear.         Session Information  Is this acupuncture treatment being billed to the patient's insurance company: Yes  Last Treatment date: 02/08/24  Name of Insurance Company: [employer]  Visit Type: Follow-up visit  Medical History Reviewed: I have reviewed pertinent medical history in EHR, and no contraindications are present to provide treatment         Review of Systems  Sleep: better with daughter sleeping better.   Digestion: doing good.   Stress: high  Gyne: She is around ovulation right now.  Cardiology: no concerns there - no palpitations recently.        Provider reviewed plan for the acupuncture session, precautions and contraindications. Patient/guardian/hospital staff has given consent to treat with full understanding of what to expect during the session. Before acupuncture began, provider explained to the patient to communicate at any time if the procedure was causing discomfort past their tolerance level. Patient agreed to advise acupuncturist. The acupuncturist counseled the patient on the risks of acupuncture treatment including pain, infection, bleeding, and no relief of pain. The patient was positioned comfortably. There was no evidence of infection at the site of needle insertions.    Objective   Physical Exam              Acupuncture Treatment  Patient Position: Supine on a table  Acupuncture Needling: Yes  Needle Guage: 36 guage /.20/  Blue erasto  Body Points: With retention  Body Points - Bilateral: Du 20, LI 11, SJ 5, FRED 10, R 12, ST 25, R 6, ST 36, SP 6  Auricular Points: No  Electroacupuncture Used: No  Other Techniques Utilized: Ear seeds, TDP Lamp, Cupping  Cupping Description: running cupping on upper back  Ear Seeds: Stainless steel (shenmen)  TDP Lamp Descripton: feet and abdomen  Needle Count In: 15  Needle Count Out: 15  Needle Retention Time (min): 25 minutes  Total Face to Face Time (min): 25 minutes              Assessment/Plan   Diagnoses and all orders for this visit:  Neck pain  Chronic pain of right upper extremity  Upper back pain on right side  Sore throat

## 2024-03-06 ENCOUNTER — ALLIED HEALTH (OUTPATIENT)
Dept: INTEGRATIVE MEDICINE | Facility: CLINIC | Age: 39
End: 2024-03-06
Payer: COMMERCIAL

## 2024-03-06 DIAGNOSIS — M54.2 NECK PAIN: Primary | ICD-10-CM

## 2024-03-06 DIAGNOSIS — J02.9 SORE THROAT: ICD-10-CM

## 2024-03-06 DIAGNOSIS — G89.29 CHRONIC PAIN OF RIGHT UPPER EXTREMITY: ICD-10-CM

## 2024-03-06 DIAGNOSIS — M79.601 CHRONIC PAIN OF RIGHT UPPER EXTREMITY: ICD-10-CM

## 2024-03-06 PROCEDURE — 97811 ACUP 1/> W/O ESTIM EA ADD 15: CPT | Performed by: ACUPUNCTURIST

## 2024-03-06 PROCEDURE — 97810 ACUP 1/> WO ESTIM 1ST 15 MIN: CPT | Performed by: ACUPUNCTURIST

## 2024-03-06 NOTE — PROGRESS NOTES
Acupuncture Visit:     Subjective   Patient ID: Elizabeth Barnes is a 39 y.o. female who presents for Neck Pain, Shoulder Pain, and URI    Neck pain and Chronic right shoulder/upper back pain: she felt the cupping was helpful.  Less pain and tension in her neck and back and shoulder for several days after cupping.  Feeling tension and mild discomfort now.    Sore throat: She still has a lingering sore throat that she has had for a month. Her energy is good.  Feeling a little dizzy.         Session Information  Is this acupuncture treatment being billed to the patient's insurance company: Yes  Last Treatment date: 02/23/24  Name of Insurance Company: [place]  Visit Type: Follow-up visit  Medical History Reviewed: I have reviewed pertinent medical history in EHR, and no contraindications are present to provide treatment         Review of Systems  Sleep: doing well.   Digestion: tending toward constipation  Stress: moderate to high  Gyne: LMP 3/6/24 Period started today. She has cramping.       Provider reviewed plan for the acupuncture session, precautions and contraindications. Patient/guardian/hospital staff has given consent to treat with full understanding of what to expect during the session. Before acupuncture began, provider explained to the patient to communicate at any time if the procedure was causing discomfort past their tolerance level. Patient agreed to advise acupuncturist. The acupuncturist counseled the patient on the risks of acupuncture treatment including pain, infection, bleeding, and no relief of pain. The patient was positioned comfortably. There was no evidence of infection at the site of needle insertions.    Objective   Physical Exam              Acupuncture Treatment  Patient Position: Supine on a table  Acupuncture Needling: Yes  Needle Guage: 40 guage /.16/ Red seirin  Body Points: With retention  Body Points - Bilateral: yintang, Du 20, LI 4, LI 11, FRED 7, GB 21, ST 36, R 12, SP 15, R  6  Auricular Points: No  Electroacupuncture Used: No  Other Techniques Utilized: Ear seeds, TDP Lamp, Cupping  Cupping Description: running cupping on upper back  Ear Seeds: Stainless steel (shenmen)  TDP Lamp Descripton: feet and abdomen  Needle Count In: 16  Needle Count Out: 16  Needle Retention Time (min): 25 minutes  Total Face to Face Time (min): 25 minutes              Assessment/Plan   Diagnoses and all orders for this visit:  Neck pain  Chronic pain of right upper extremity  Sore throat

## 2024-03-14 ENCOUNTER — ALLIED HEALTH (OUTPATIENT)
Dept: INTEGRATIVE MEDICINE | Facility: CLINIC | Age: 39
End: 2024-03-14
Payer: COMMERCIAL

## 2024-03-14 DIAGNOSIS — J06.9 VIRAL UPPER RESPIRATORY TRACT INFECTION: ICD-10-CM

## 2024-03-14 DIAGNOSIS — R51.9 NONINTRACTABLE HEADACHE, UNSPECIFIED CHRONICITY PATTERN, UNSPECIFIED HEADACHE TYPE: ICD-10-CM

## 2024-03-14 DIAGNOSIS — M54.2 NECK PAIN: Primary | ICD-10-CM

## 2024-03-14 PROCEDURE — 97810 ACUP 1/> WO ESTIM 1ST 15 MIN: CPT | Performed by: ACUPUNCTURIST

## 2024-03-14 PROCEDURE — 97811 ACUP 1/> W/O ESTIM EA ADD 15: CPT | Performed by: ACUPUNCTURIST

## 2024-03-14 NOTE — PROGRESS NOTES
Acupuncture Visit:     Subjective   Patient ID: Elizabeth Barnes is a 39 y.o. female who presents for Neck Pain, URI, and Headache    Neck pain and Chronic right shoulder/upper back pain: some tension and discomfort in the neck.  Her right shoulder is doing well currently.     Headache: she is having a headache and facial pain this week from sinus congestion and URI.  Her sore throat finally resolved and then she got sick again and is having some post nasal drip and throat irritation.        Session Information  Is this acupuncture treatment being billed to the patient's insurance company: Yes  Last Treatment date: 03/06/24  Name of Insurance Company: [employer]  Visit Type: Follow-up visit  Medical History Reviewed: I have reviewed pertinent medical history in EHR, and no contraindications are present to provide treatment         Review of Systems  Sleep: doing well.   Digestion: no issues this week  Stress: moderate to high  Gyne: LMP 3/6/24        Provider reviewed plan for the acupuncture session, precautions and contraindications. Patient/guardian/hospital staff has given consent to treat with full understanding of what to expect during the session. Before acupuncture began, provider explained to the patient to communicate at any time if the procedure was causing discomfort past their tolerance level. Patient agreed to advise acupuncturist. The acupuncturist counseled the patient on the risks of acupuncture treatment including pain, infection, bleeding, and no relief of pain. The patient was positioned comfortably. There was no evidence of infection at the site of needle insertions.    Objective   Physical Exam              Acupuncture Treatment  Patient Position: Supine on a table  Acupuncture Needling: Yes  Needle Guage: 40 guage /.16/ Red seirin  Body Points: With retention  Body Points - Bilateral: ST 36, Li 4, SJ 5, LI 11, R 12, ST 25, R 6, R 17, Du 20, yintang, LI 20  Auricular Points: Yes  Auricular Points  - Bilateral: zero  Other Techniques Utilized: Ear seeds, TDP Lamp  Ear Seeds: Stainless steel (shenmen)  TDP Lamp Descripton: feet and abdomen  Needle Count In: 19  Needle Count Out: 19  Needle Retention Time (min): 25 minutes  Total Face to Face Time (min): 25 minutes              Assessment/Plan   Diagnoses and all orders for this visit:  Neck pain  Nonintractable headache, unspecified chronicity pattern, unspecified headache type  Viral upper respiratory tract infection

## 2024-03-22 ENCOUNTER — ALLIED HEALTH (OUTPATIENT)
Dept: INTEGRATIVE MEDICINE | Facility: CLINIC | Age: 39
End: 2024-03-22
Payer: COMMERCIAL

## 2024-03-22 DIAGNOSIS — R51.9 NONINTRACTABLE HEADACHE, UNSPECIFIED CHRONICITY PATTERN, UNSPECIFIED HEADACHE TYPE: ICD-10-CM

## 2024-03-22 DIAGNOSIS — M54.2 NECK PAIN: Primary | ICD-10-CM

## 2024-03-22 DIAGNOSIS — J02.9 SORE THROAT: ICD-10-CM

## 2024-03-22 DIAGNOSIS — J06.9 VIRAL UPPER RESPIRATORY TRACT INFECTION: ICD-10-CM

## 2024-03-22 DIAGNOSIS — M79.601 CHRONIC PAIN OF RIGHT UPPER EXTREMITY: ICD-10-CM

## 2024-03-22 DIAGNOSIS — G89.29 CHRONIC PAIN OF RIGHT UPPER EXTREMITY: ICD-10-CM

## 2024-03-22 PROCEDURE — 97811 ACUP 1/> W/O ESTIM EA ADD 15: CPT | Performed by: ACUPUNCTURIST

## 2024-03-22 PROCEDURE — 97810 ACUP 1/> WO ESTIM 1ST 15 MIN: CPT | Performed by: ACUPUNCTURIST

## 2024-03-22 NOTE — PROGRESS NOTES
Acupuncture Visit:     Subjective   Patient ID: Elizabeth Barnes is a 39 y.o. female who presents for Shoulder Pain, Neck Pain, Headache, and URI    Neck pain and Chronic right shoulder/upper back pain: some neck and upper back pain with her URI.  Shoulder is doing well because she has been resting a lot.     Headache: no headaches this week.     URI: she got another URI this Monday.  She has been having post nasal drip with a sore throat and swollen and sore glands.         Session Information  Is this acupuncture treatment being billed to the patient's insurance company: Yes  Last Treatment date: 03/14/24  Name of Insurance Company: [place]  Visit Type: Follow-up visit  Medical History Reviewed: I have reviewed pertinent medical history in EHR, and no contraindications are present to provide treatment         Review of Systems  Sleep: she slept well   Digestion: doing well. Good even during travel.  Stress: higher because this is her busy time of year.   Gyne: LMP 3/6/24  She ovulated this past weekend.        Provider reviewed plan for the acupuncture session, precautions and contraindications. Patient/guardian/hospital staff has given consent to treat with full understanding of what to expect during the session. Before acupuncture began, provider explained to the patient to communicate at any time if the procedure was causing discomfort past their tolerance level. Patient agreed to advise acupuncturist. The acupuncturist counseled the patient on the risks of acupuncture treatment including pain, infection, bleeding, and no relief of pain. The patient was positioned comfortably. There was no evidence of infection at the site of needle insertions.    Objective   Physical Exam              Acupuncture Treatment  Patient Position: Supine on a table  Acupuncture Needling: Yes  Needle Guage: 36 guage /.20/ Blue seirin  Body Points: With retention  Body Points - Bilateral: Du 20, Du 23, yintang, LI 18, LI 4, SJ 5, FRED  7, GB 21, ST 36  Auricular Points: No  Electroacupuncture Used: No  Other Techniques Utilized: Ear seeds, TDP Lamp  Ear Seeds: Stainless steel  TDP Lamp Descripton: feet and abdomen  Needle Count In: 15  Needle Count Out: 15  Needle Retention Time (min): 25 minutes  Total Face to Face Time (min): 25 minutes              Assessment/Plan   Diagnoses and all orders for this visit:  Neck pain  Chronic pain of right upper extremity  Nonintractable headache, unspecified chronicity pattern, unspecified headache type  Sore throat  Viral upper respiratory tract infection

## 2024-04-05 ENCOUNTER — ALLIED HEALTH (OUTPATIENT)
Dept: INTEGRATIVE MEDICINE | Facility: CLINIC | Age: 39
End: 2024-04-05
Payer: COMMERCIAL

## 2024-04-05 DIAGNOSIS — J06.9 VIRAL UPPER RESPIRATORY TRACT INFECTION: ICD-10-CM

## 2024-04-05 DIAGNOSIS — R51.9 NONINTRACTABLE HEADACHE, UNSPECIFIED CHRONICITY PATTERN, UNSPECIFIED HEADACHE TYPE: ICD-10-CM

## 2024-04-05 DIAGNOSIS — J02.9 SORE THROAT: ICD-10-CM

## 2024-04-05 DIAGNOSIS — M79.601 CHRONIC PAIN OF RIGHT UPPER EXTREMITY: ICD-10-CM

## 2024-04-05 DIAGNOSIS — G89.29 CHRONIC PAIN OF RIGHT UPPER EXTREMITY: ICD-10-CM

## 2024-04-05 DIAGNOSIS — M54.2 NECK PAIN: Primary | ICD-10-CM

## 2024-04-05 PROCEDURE — 97810 ACUP 1/> WO ESTIM 1ST 15 MIN: CPT | Performed by: ACUPUNCTURIST

## 2024-04-05 PROCEDURE — 97811 ACUP 1/> W/O ESTIM EA ADD 15: CPT | Performed by: ACUPUNCTURIST

## 2024-04-05 NOTE — PROGRESS NOTES
Acupuncture Visit:     Subjective   Patient ID: Elizabeth Barnes is a 39 y.o. female who presents for Headache, Neck Pain, and URI    Neck pain and Chronic right shoulder/upper back pain: some tension and discomfort in the neck.  Her right shoulder is doing well currently.     Headache: one headaches this week.     URI: resolving from last week.  Still some sore throat and post nasal drip.    She has been having heart palpitations again. She is also feeling dizzy lately.  She is weaning baby right now and wonders if this is hormone related.         Session Information  Is this acupuncture treatment being billed to the patient's insurance company: Yes  Last Treatment date: 03/22/24  Name of Insurance Company: [place]  Visit Type: Follow-up visit  Medical History Reviewed: I have reviewed pertinent medical history in EHR, and no contraindications are present to provide treatment         Review of Systems  Sleep: she slept well   Digestion: doing well. Good even during travel.  Stress: higher because this is her busy time of year.   Gyne:  3/6/24  LMP 03/28/24 Periods have been painful and long and heavy. She typically had 3 day periods and now 7 days.        Provider reviewed plan for the acupuncture session, precautions and contraindications. Patient/guardian/hospital staff has given consent to treat with full understanding of what to expect during the session. Before acupuncture began, provider explained to the patient to communicate at any time if the procedure was causing discomfort past their tolerance level. Patient agreed to advise acupuncturist. The acupuncturist counseled the patient on the risks of acupuncture treatment including pain, infection, bleeding, and no relief of pain. The patient was positioned comfortably. There was no evidence of infection at the site of needle insertions.    Objective   Physical Exam              Acupuncture Treatment  Patient Position: Supine on a table  Acupuncture  Needling: Yes  Needle Guage: 36 guage /.20/ Blue seirin  Body Points: With retention  Body Points - Bilateral: DU 20, yintang, LI 20, LI 4, SJ 5, FRED 7, LI 11, ST 36  Auricular Points: No  Electroacupuncture Used: No  Other Techniques Utilized: Ear seeds, TDP Lamp, Cupping  Cupping Description: running cupping on upper back with massage oil  Ear Seeds: Stainless steel (shenmen)  TDP Lamp Descripton: feet and abdomen  Needle Count In: 14  Needle Count Out: 14  Needle Retention Time (min): 25 minutes  Total Face to Face Time (min): 25 minutes              Assessment/Plan   Diagnoses and all orders for this visit:  Neck pain  Chronic pain of right upper extremity  Nonintractable headache, unspecified chronicity pattern, unspecified headache type  Viral upper respiratory tract infection  Sore throat

## 2024-04-19 ENCOUNTER — ALLIED HEALTH (OUTPATIENT)
Dept: INTEGRATIVE MEDICINE | Facility: CLINIC | Age: 39
End: 2024-04-19
Payer: COMMERCIAL

## 2024-04-19 DIAGNOSIS — M54.2 NECK PAIN: Primary | ICD-10-CM

## 2024-04-19 DIAGNOSIS — R51.9 NONINTRACTABLE HEADACHE, UNSPECIFIED CHRONICITY PATTERN, UNSPECIFIED HEADACHE TYPE: ICD-10-CM

## 2024-04-19 DIAGNOSIS — J06.9 VIRAL UPPER RESPIRATORY TRACT INFECTION: ICD-10-CM

## 2024-04-19 PROCEDURE — 97811 ACUP 1/> W/O ESTIM EA ADD 15: CPT | Performed by: ACUPUNCTURIST

## 2024-04-19 PROCEDURE — 97810 ACUP 1/> WO ESTIM 1ST 15 MIN: CPT | Performed by: ACUPUNCTURIST

## 2024-04-19 NOTE — PROGRESS NOTES
Acupuncture Visit:     Subjective   Patient ID: Elizabeth Barnes is a 39 y.o. female who presents for Neck Pain, Headache, and URI    Neck pain and Chronic right shoulder/upper back pain: more soreness and tension in the neck and upper back this wee.     Headache: she had another headache once this week.     URI: continuing to feel not 100% yet following her URI    Her palpitations have gotten better.  Not experiencing these recently.         Session Information  Is this acupuncture treatment being billed to the patient's insurance company: Yes  Last Treatment date: 04/05/24  Name of Insurance Company: [employer]  Visit Type: Follow-up visit  Medical History Reviewed: I have reviewed pertinent medical history in EHR, and no contraindications are present to provide treatment         Review of Systems  Sleep: going well.    Digestion: doing better this week.    Stress: higher because her kids are sick  Gyne:  3/6/24  LMP 3/28/24 She had a lot of ovulatory pain on 4/10.  She needed 3 Advil every 6 hours for about 1 day.  She started vitex.        Provider reviewed plan for the acupuncture session, precautions and contraindications. Patient/guardian/hospital staff has given consent to treat with full understanding of what to expect during the session. Before acupuncture began, provider explained to the patient to communicate at any time if the procedure was causing discomfort past their tolerance level. Patient agreed to advise acupuncturist. The acupuncturist counseled the patient on the risks of acupuncture treatment including pain, infection, bleeding, and no relief of pain. The patient was positioned comfortably. There was no evidence of infection at the site of needle insertions.    Objective   Physical Exam              Acupuncture Treatment  Patient Position: Supine on a table  Acupuncture Needling: Yes  Needle Guage: 40 guage /.16/ Red seirin  Body Points: With retention  Body Points - Bilateral: Du 20, yintang,  LI 20, LI 4, FRED 7, SJ 5, LI 11, R 12, ST 25, R 6, ST 36  Auricular Points: No  Electroacupuncture Used: No  Other Techniques Utilized: Ear seeds, TDP Lamp  Ear Seeds: Stainless steel (shenmen)  TDP Lamp Descripton: feet and abdomen  Needle Count In: 18  Needle Count Out: 18  Needle Retention Time (min): 25 minutes  Total Face to Face Time (min): 25 minutes              Assessment/Plan   Diagnoses and all orders for this visit:  Neck pain  Nonintractable headache, unspecified chronicity pattern, unspecified headache type  Viral upper respiratory tract infection

## 2024-04-26 ENCOUNTER — ALLIED HEALTH (OUTPATIENT)
Dept: INTEGRATIVE MEDICINE | Facility: CLINIC | Age: 39
End: 2024-04-26
Payer: COMMERCIAL

## 2024-04-26 DIAGNOSIS — M54.9 UPPER BACK PAIN ON RIGHT SIDE: ICD-10-CM

## 2024-04-26 DIAGNOSIS — N94.6 DYSMENORRHEA: ICD-10-CM

## 2024-04-26 DIAGNOSIS — R51.9 NONINTRACTABLE HEADACHE, UNSPECIFIED CHRONICITY PATTERN, UNSPECIFIED HEADACHE TYPE: ICD-10-CM

## 2024-04-26 DIAGNOSIS — M54.2 NECK PAIN: Primary | ICD-10-CM

## 2024-04-26 PROCEDURE — 97810 ACUP 1/> WO ESTIM 1ST 15 MIN: CPT | Performed by: ACUPUNCTURIST

## 2024-04-26 PROCEDURE — 97811 ACUP 1/> W/O ESTIM EA ADD 15: CPT | Performed by: ACUPUNCTURIST

## 2024-04-26 NOTE — PROGRESS NOTES
Acupuncture Visit:     Subjective   Patient ID: Elizabeth Barnes is a 39 y.o. female who presents for Neck Pain, Back Pain, Headache, and URI    Neck pain and Chronic right shoulder/upper back pain: neck and shoulder have been doing better with regular acupuncture.  Mild tension this week.     Headache: she had a headache with the start of her period.     URI: she feels like things are resolved with her cold. She was traveling this week and trying not to get sick.         Session Information  Is this acupuncture treatment being billed to the patient's insurance company: Yes  Last Treatment date: 04/19/24  Name of Insurance Company: [employer]  Visit Type: Follow-up visit  Medical History Reviewed: I have reviewed pertinent medical history in EHR, and no contraindications are present to provide treatment         Review of Systems  Sleep: going well.    Digestion: doing better this week.    Stress: higher because her kids are sick  Gyne:  LMP 4/22/24  This period was a little better.  Only one really heavy day and the rest were more normal flow.  She is getting cramping and more cramping today and did not need medication.           Provider reviewed plan for the acupuncture session, precautions and contraindications. Patient/guardian/hospital staff has given consent to treat with full understanding of what to expect during the session. Before acupuncture began, provider explained to the patient to communicate at any time if the procedure was causing discomfort past their tolerance level. Patient agreed to advise acupuncturist. The acupuncturist counseled the patient on the risks of acupuncture treatment including pain, infection, bleeding, and no relief of pain. The patient was positioned comfortably. There was no evidence of infection at the site of needle insertions.    Objective   Physical Exam              Acupuncture Treatment  Patient Position: Supine on a table  Acupuncture Needling: Yes  Needle Guage: 40 guage  /.16/ Red seirin  Body Points: With retention  Body Points - Bilateral: Du 20, yintang, R 12, SP 15, R 6, zigong, SP 10, SP 6  Auricular Points: No  Electroacupuncture Used: No  Other Techniques Utilized: Ear seeds, TDP Lamp  Ear Seeds: Stainless steel (shenmen)  TDP Lamp Descripton: feet and abdomen  Needle Count In: 12  Needle Count Out: 12  Needle Retention Time (min): 25 minutes  Total Face to Face Time (min): 25 minutes              Assessment/Plan   Diagnoses and all orders for this visit:  Neck pain  Upper back pain on right side  Nonintractable headache, unspecified chronicity pattern, unspecified headache type  Dysmenorrhea

## 2024-05-03 ENCOUNTER — ALLIED HEALTH (OUTPATIENT)
Dept: INTEGRATIVE MEDICINE | Facility: CLINIC | Age: 39
End: 2024-05-03
Payer: COMMERCIAL

## 2024-05-03 DIAGNOSIS — M54.2 NECK PAIN: Primary | ICD-10-CM

## 2024-05-03 DIAGNOSIS — M54.9 UPPER BACK PAIN ON RIGHT SIDE: ICD-10-CM

## 2024-05-03 DIAGNOSIS — N94.6 DYSMENORRHEA: ICD-10-CM

## 2024-05-03 DIAGNOSIS — R51.9 NONINTRACTABLE HEADACHE, UNSPECIFIED CHRONICITY PATTERN, UNSPECIFIED HEADACHE TYPE: ICD-10-CM

## 2024-05-03 PROCEDURE — 97810 ACUP 1/> WO ESTIM 1ST 15 MIN: CPT | Performed by: ACUPUNCTURIST

## 2024-05-03 PROCEDURE — 97811 ACUP 1/> W/O ESTIM EA ADD 15: CPT | Performed by: ACUPUNCTURIST

## 2024-05-03 NOTE — PROGRESS NOTES
Acupuncture Visit:     Subjective   Patient ID: Elizabeth Barnes is a 39 y.o. female who presents for Neck Pain, Back Pain, and Headache    She has had a really good week.  She feels healthy and her body feels good.     Neck pain and Chronic right shoulder/upper back pain: no issues this week.    Headache: one headache since the last visit.  Some sinus congestion.     Palpitations: palpitations are coming and going.        Session Information  Is this acupuncture treatment being billed to the patient's insurance company: Yes  Last Treatment date: 04/26/24  Name of Insurance Company: [employer]  Visit Type: Follow-up visit  Medical History Reviewed: I have reviewed pertinent medical history in EHR, and no contraindications are present to provide treatment         Review of Systems  Sleep: restless sleep. Not hot and no night sweats.  Waking earlier and then restless after.  Digestion: slight constipation  Stress: a little better this week  Gyne: LMP 4/22/24  She is getting ready to ovulate and feels that her period was better this time.          Provider reviewed plan for the acupuncture session, precautions and contraindications. Patient/guardian/hospital staff has given consent to treat with full understanding of what to expect during the session. Before acupuncture began, provider explained to the patient to communicate at any time if the procedure was causing discomfort past their tolerance level. Patient agreed to advise acupuncturist. The acupuncturist counseled the patient on the risks of acupuncture treatment including pain, infection, bleeding, and no relief of pain. The patient was positioned comfortably. There was no evidence of infection at the site of needle insertions.    Objective   Physical Exam              Acupuncture Treatment  Patient Position: Supine on a table  Acupuncture Needling: Yes  Needle Guage: 36 guage /.20/ Blue seirin  Body Points: With retention  Body Points - Bilateral: Du 20, yintang,  P 6, R 17, R 14, R 12, SP 15, R 6, ST 36  Auricular Points: No  Electroacupuncture Used: No  Other Techniques Utilized: Ear seeds, TDP Lamp  Ear Seeds: Stainless steel (shenmen)  TDP Lamp Descripton: feet and abdomen  Needle Count In: 12  Needle Count Out: 12  Needle Retention Time (min): 25 minutes  Total Face to Face Time (min): 25 minutes              Assessment/Plan   Diagnoses and all orders for this visit:  Neck pain  Upper back pain on right side  Nonintractable headache, unspecified chronicity pattern, unspecified headache type  Dysmenorrhea

## 2024-05-09 ENCOUNTER — ALLIED HEALTH (OUTPATIENT)
Dept: INTEGRATIVE MEDICINE | Facility: CLINIC | Age: 39
End: 2024-05-09
Payer: COMMERCIAL

## 2024-05-09 DIAGNOSIS — R51.9 NONINTRACTABLE HEADACHE, UNSPECIFIED CHRONICITY PATTERN, UNSPECIFIED HEADACHE TYPE: ICD-10-CM

## 2024-05-09 DIAGNOSIS — M25.562 LEFT KNEE PAIN, UNSPECIFIED CHRONICITY: ICD-10-CM

## 2024-05-09 DIAGNOSIS — M54.9 UPPER BACK PAIN ON RIGHT SIDE: ICD-10-CM

## 2024-05-09 DIAGNOSIS — M54.2 NECK PAIN: Primary | ICD-10-CM

## 2024-05-09 PROCEDURE — 97811 ACUP 1/> W/O ESTIM EA ADD 15: CPT | Performed by: ACUPUNCTURIST

## 2024-05-09 PROCEDURE — 97810 ACUP 1/> WO ESTIM 1ST 15 MIN: CPT | Performed by: ACUPUNCTURIST

## 2024-05-09 NOTE — PROGRESS NOTES
Acupuncture Visit:     Subjective   Patient ID: Elizabeth Barnes is a 39 y.o. female who presents for Neck Pain, Shoulder Pain, Knee Pain, and Headache    Neck pain and Chronic right shoulder/upper back pain: minimal tension this week.     (L) knee pain: she is having some knee pain on the left knee.  Pain is on the lateral side and she has clicking when she squats down.     Headache: she has had a couple of headaches since the last visit.    Palpitations: she has been getting more palpitations         Session Information  Is this acupuncture treatment being billed to the patient's insurance company: Yes  Last Treatment date: 05/03/24  Name of Insurance Company: [employer]  Visit Type: Follow-up visit  Medical History Reviewed: I have reviewed pertinent medical history in EHR, and no contraindications are present to provide treatment         Review of Systems  Sleep: restless sleep. Hard week with sleep.    Digestion: tending toward constipation  Stress: high  Gyne: LMP 4/22/24   She ovulated about a week ago.          Provider reviewed plan for the acupuncture session, precautions and contraindications. Patient/guardian/hospital staff has given consent to treat with full understanding of what to expect during the session. Before acupuncture began, provider explained to the patient to communicate at any time if the procedure was causing discomfort past their tolerance level. Patient agreed to advise acupuncturist. The acupuncturist counseled the patient on the risks of acupuncture treatment including pain, infection, bleeding, and no relief of pain. The patient was positioned comfortably. There was no evidence of infection at the site of needle insertions.    Objective   Physical Exam              Acupuncture Treatment  Patient Position: Supine on a table  Acupuncture Needling: Yes  Needle Guage: 40 guage /.16/ Red seirin, 36 guage /.20/ Blue seirin  Body Points: With retention  Body Points - Bilateral: Du 20, yintang,  SJ 6, LI 11, R 17, R 14, R 12, R 6, ST 25, ST 36  Body Points - Right: ST 34, heding  Auricular Points: No  Electroacupuncture Used: No  Other Techniques Utilized: Ear seeds, TDP Lamp  Ear Seeds: Stainless steel (shenmen)  TDP Lamp Descripton: (L) knee and abdomen  Needle Count In: 16  Needle Count Out: 16  Needle Retention Time (min): 25 minutes  Total Face to Face Time (min): 25 minutes              Assessment/Plan   Diagnoses and all orders for this visit:  Neck pain  Upper back pain on right side  Left knee pain, unspecified chronicity  Nonintractable headache, unspecified chronicity pattern, unspecified headache type

## 2024-05-10 ENCOUNTER — APPOINTMENT (OUTPATIENT)
Dept: INTEGRATIVE MEDICINE | Facility: CLINIC | Age: 39
End: 2024-05-10
Payer: COMMERCIAL

## 2024-05-10 ENCOUNTER — ALLIED HEALTH (OUTPATIENT)
Dept: INTEGRATIVE MEDICINE | Facility: CLINIC | Age: 39
End: 2024-05-10

## 2024-05-10 PROCEDURE — CYTAX SALES TAX CUYAHOGA COUNT: Performed by: MASSAGE THERAPIST

## 2024-05-10 PROCEDURE — MASSG60 MASSAGE 60 MINUTES: Performed by: MASSAGE THERAPIST

## 2024-05-10 NOTE — PROGRESS NOTES
Massage Therapy Visit:     Elizabeth Barnes was self-referred.    Condition of Client Subjective :  Patient ID: Elizabeth Barnes is a 39 y.o. female who presents for reason for visit of Muscle tension release and Relaxation massage.  HPI    Session Information  Visit Type: Follow-up visit  Description of present complaint: Muscle tension, Discomfort, Stress, Range of motion (ROM)    Review of Systems    Objective   Pre-treatment Assessment  Condition of Client Note: Tension, discomfort felt on R thigh muscles, R calf muscles, R knee.    Physical Exam    Actions Assessment/Plan :    Massage Treatment  Patient Position: Table, Supine  Positioning Assistance: Did not need assistance, Pillow(s)/bolster under knees while supine  Massage Technique: Soft tissue mobilization, Therapeutic massage, Myofascial release, Relaxation massage, Stretching, Nurturing touch  Pressure Scale: 2 - Mild pressure, 3 - Medium pressure, 4 - Moderate pressure  Action Note: Cervical/upper body, kneading, stripping, palming, effleurage, petrissage, cross fiber, on Platysma, SCM, SITS, Levator Scapulae, Trapezius, Rhomboids, upper Pectoralis, Deltoids.  UE, stretching, cross fiber, palming, kneading, effleurage, on Deltoids, Biceps, Triceps, forearm muscles, hand muscles.  LE, kneading, stripping, palming, knuckle, effleurage, petrissage, cross fiber, stretching, on Vastus group, TFL, lateral approach to Hip Flexors, Hamstrings, Sartorius, Gracilis, Gastrocnemius, Soleus, Tibialis, plantar/dorsal aspect of foot.    Response:       Evaluation:   Massage Therapy Evaluation / Recommendation(s) / Follow-up  Post-Treatment Recommendation: Increase hydration  Follow-up: Follow up scheduled.

## 2024-05-24 ENCOUNTER — ALLIED HEALTH (OUTPATIENT)
Dept: INTEGRATIVE MEDICINE | Facility: CLINIC | Age: 39
End: 2024-05-24
Payer: COMMERCIAL

## 2024-05-24 DIAGNOSIS — R51.9 NONINTRACTABLE HEADACHE, UNSPECIFIED CHRONICITY PATTERN, UNSPECIFIED HEADACHE TYPE: ICD-10-CM

## 2024-05-24 DIAGNOSIS — M54.2 NECK PAIN: Primary | ICD-10-CM

## 2024-05-24 DIAGNOSIS — M25.562 LEFT KNEE PAIN, UNSPECIFIED CHRONICITY: ICD-10-CM

## 2024-05-24 PROCEDURE — 97810 ACUP 1/> WO ESTIM 1ST 15 MIN: CPT | Performed by: ACUPUNCTURIST

## 2024-05-24 PROCEDURE — 97811 ACUP 1/> W/O ESTIM EA ADD 15: CPT | Performed by: ACUPUNCTURIST

## 2024-05-24 NOTE — PROGRESS NOTES
Acupuncture Visit:     Subjective   Patient ID: Elizabeth Barnes is a 39 y.o. female who presents for Neck Pain, Headache, and Knee Pain    Neck pain and Chronic right shoulder/upper back pain:  doing well with her neck and back after being on vacation and away from her computer. Mild stiffness.    (L) knee pain: still getting some pain in the left knee. Pain above the lateral left knee.     Headache: doing better after vacation.  No headaches this week.    Palpitations: she is feeling more palpitations (feels her heartbeat) and feeling aching in her left upper quadrant. Possibly irregular feeling to her heartbeat.  She is going to get her thyroid retested.                   Review of Systems  Sleep:  sleeping well. She slept really well on vacation.   Digestion: more loose stool.  Stress: stress is moderate. She is feeling wired and thinks it might be her thyroid.   Gyne:  4/22/24  LMP 5/14/24  She had a 24 day cycle.  Flow was not as heavy as the past couple of periods. Cramping was less this time. Still taking Vitex.         Provider reviewed plan for the acupuncture session, precautions and contraindications. Patient/guardian/hospital staff has given consent to treat with full understanding of what to expect during the session. Before acupuncture began, provider explained to the patient to communicate at any time if the procedure was causing discomfort past their tolerance level. Patient agreed to advise acupuncturist. The acupuncturist counseled the patient on the risks of acupuncture treatment including pain, infection, bleeding, and no relief of pain. The patient was positioned comfortably. There was no evidence of infection at the site of needle insertions.    Objective   Physical Exam              Acupuncture Treatment  Body Points - Left: SP 10, ST 34  Body Points - Bilateral: Du 20, yintang, LI 11, SJ 5, DANIAL 14, R 17, R 14, ST 25, ST 36, KD 6  Auricular Points: No  Electroacupuncture Used: No  Other  Techniques Utilized: Ear seeds, TDP Lamp  Ear Seeds: Stainless steel (shenmen)  TDP Lamp Descripton: feet and abdomen  Needle Count In: 18  Needle Count Out: 18  Needle Retention Time (min): 25 minutes  Total Face to Face Time (min): 25 minutes              Assessment/Plan   Diagnoses and all orders for this visit:  Neck pain  Nonintractable headache, unspecified chronicity pattern, unspecified headache type  Left knee pain, unspecified chronicity

## 2024-05-31 ENCOUNTER — ALLIED HEALTH (OUTPATIENT)
Dept: INTEGRATIVE MEDICINE | Facility: CLINIC | Age: 39
End: 2024-05-31
Payer: COMMERCIAL

## 2024-05-31 DIAGNOSIS — M54.2 NECK PAIN: Primary | ICD-10-CM

## 2024-05-31 DIAGNOSIS — R51.9 NONINTRACTABLE HEADACHE, UNSPECIFIED CHRONICITY PATTERN, UNSPECIFIED HEADACHE TYPE: ICD-10-CM

## 2024-05-31 DIAGNOSIS — M25.562 LEFT KNEE PAIN, UNSPECIFIED CHRONICITY: ICD-10-CM

## 2024-05-31 PROCEDURE — 97810 ACUP 1/> WO ESTIM 1ST 15 MIN: CPT | Performed by: ACUPUNCTURIST

## 2024-05-31 PROCEDURE — 97811 ACUP 1/> W/O ESTIM EA ADD 15: CPT | Performed by: ACUPUNCTURIST

## 2024-05-31 NOTE — PROGRESS NOTES
Acupuncture Visit:     Subjective   Patient ID: Elizabeth Barnes is a 39 y.o. female who presents for Neck Pain, Knee Pain, and Headache    Neck pain and Chronic right shoulder/upper back pain:  she has been exercising more so she is sore in her neck and upper back     (L) knee pain: she is still getting left knee pain.  Pain is down the center of her knee and it clicks with deep squats.     Headache: she had one bad headache in the past week.     Palpitations: she is still getting heart palpitations but feeling less anxious and wired overall. She is going to get her thyroid tested soon.        Session Information  Is this acupuncture treatment being billed to the patient's insurance company: Yes  Last Treatment date: 05/24/24  Name of Insurance Company: [employer]  Visit Type: Follow-up visit  Medical History Reviewed: I have reviewed pertinent medical history in EHR, and no contraindications are present to provide treatment         Review of Systems  Sleep:  really good sleep  Digestion: tending toward loose stool  Stress: stress is moderate.   Gyne:  4/22/24  LMP 5/14/24  She had a 24 day cycle.  Flow was not as heavy as the past couple of periods. Cramping was less this time. Still taking Vitex.         Provider reviewed plan for the acupuncture session, precautions and contraindications. Patient/guardian/hospital staff has given consent to treat with full understanding of what to expect during the session. Before acupuncture began, provider explained to the patient to communicate at any time if the procedure was causing discomfort past their tolerance level. Patient agreed to advise acupuncturist. The acupuncturist counseled the patient on the risks of acupuncture treatment including pain, infection, bleeding, and no relief of pain. The patient was positioned comfortably. There was no evidence of infection at the site of needle insertions.    Objective   Physical Exam              Acupuncture Treatment  Patient  Position: Supine on a table  Acupuncture Needling: Yes  Needle Guage: 40 guage /.16/ Red seirin  Body Points: With retention  Body Points - Left: SP 15, ST 34, Sp 10, heding, xiyan, ST 35  Body Points - Bilateral: Du 20, yintang, SJ 5, R 12, R 6, SP 6  Body Points - Right: ST 25  Auricular Points: No  Electroacupuncture Used: No  Other Techniques Utilized: Ear seeds, TDP Lamp  Ear Seeds: Stainless steel (shenmen)  TDP Lamp Descripton: (L) knee  Needle Count In: 15  Needle Count Out: 15  Needle Retention Time (min): 25 minutes  Total Face to Face Time (min): 25 minutes              Assessment/Plan   Diagnoses and all orders for this visit:  Neck pain  Nonintractable headache, unspecified chronicity pattern, unspecified headache type  Left knee pain, unspecified chronicity

## 2024-06-03 ENCOUNTER — LAB (OUTPATIENT)
Dept: LAB | Facility: LAB | Age: 39
End: 2024-06-03
Payer: COMMERCIAL

## 2024-06-03 DIAGNOSIS — E03.9 HYPOTHYROIDISM, UNSPECIFIED TYPE: ICD-10-CM

## 2024-06-07 ENCOUNTER — ALLIED HEALTH (OUTPATIENT)
Dept: INTEGRATIVE MEDICINE | Facility: CLINIC | Age: 39
End: 2024-06-07
Payer: COMMERCIAL

## 2024-06-07 DIAGNOSIS — M25.562 LEFT KNEE PAIN, UNSPECIFIED CHRONICITY: ICD-10-CM

## 2024-06-07 DIAGNOSIS — M54.2 NECK PAIN: Primary | ICD-10-CM

## 2024-06-07 DIAGNOSIS — R51.9 NONINTRACTABLE HEADACHE, UNSPECIFIED CHRONICITY PATTERN, UNSPECIFIED HEADACHE TYPE: ICD-10-CM

## 2024-06-07 PROCEDURE — 97810 ACUP 1/> WO ESTIM 1ST 15 MIN: CPT | Performed by: ACUPUNCTURIST

## 2024-06-07 PROCEDURE — 97811 ACUP 1/> W/O ESTIM EA ADD 15: CPT | Performed by: ACUPUNCTURIST

## 2024-06-07 NOTE — PROGRESS NOTES
Acupuncture Visit:     Subjective   Patient ID: Elizabeth Barnes is a 39 y.o. female who presents for Neck Pain, Knee Pain, Headache, and Palpitations    Neck pain and Chronic right shoulder/upper back pain:  doing much better this week.  Very little pain and tension.    (L) knee pain: pain is a little better but still clicking when she does squats.     Headache:  no headaches this week.     Palpitations: still getting heart palpitations but maybe a little better this week.  Waiting for the TSH results.         Session Information  Is this acupuncture treatment being billed to the patient's insurance company: Yes  Last Treatment date: 05/31/24  Name of Insurance Company: [employer]  Visit Type: Follow-up visit  Medical History Reviewed: I have reviewed pertinent medical history in EHR, and no contraindications are present to provide treatment         Review of Systems  Sleep: good.   Digestion: more formed stool this week  Stress: stress has been manageable even with traveling  Gyne:  4/22/24   5/14/24  LMP 6/7/24  This was a 25 day cycle.  Mild cramping today but usually CD 2 and 3 are crampy days.  No PMS this cycle and has been doing the Vitex regularly. Ovulation has also been less painful.          Provider reviewed plan for the acupuncture session, precautions and contraindications. Patient/guardian/hospital staff has given consent to treat with full understanding of what to expect during the session. Before acupuncture began, provider explained to the patient to communicate at any time if the procedure was causing discomfort past their tolerance level. Patient agreed to advise acupuncturist. The acupuncturist counseled the patient on the risks of acupuncture treatment including pain, infection, bleeding, and no relief of pain. The patient was positioned comfortably. There was no evidence of infection at the site of needle insertions.    Objective   Physical Exam              Acupuncture Treatment  Patient  Position: Supine on a table  Acupuncture Needling: Yes  Needle Guage: 40 guage /.16/ Red seirin  Body Points: With retention  Body Points - Left: ST 34, heding  Body Points - Bilateral: Du 20, yintang, LI 11, FRED 1, KD 24, R 17, R 14, ST 25, ST 36, SP 6  Auricular Points: No  Electroacupuncture Used: No  Other Techniques Utilized: Ear seeds, TDP Lamp  Ear Seeds: Stainless steel (shenmen)  TDP Lamp Descripton: feet and abdomen  Needle Count In: 18  Needle Count Out: 18  Needle Retention Time (min): 25 minutes  Total Face to Face Time (min): 25 minutes              Assessment/Plan   Diagnoses and all orders for this visit:  Neck pain  Left knee pain, unspecified chronicity  Nonintractable headache, unspecified chronicity pattern, unspecified headache type

## 2024-06-11 ENCOUNTER — TELEPHONE (OUTPATIENT)
Dept: ENDOCRINOLOGY | Facility: CLINIC | Age: 39
End: 2024-06-11
Payer: COMMERCIAL

## 2024-06-11 DIAGNOSIS — E03.9 HYPOTHYROIDISM, UNSPECIFIED TYPE: Primary | ICD-10-CM

## 2024-06-11 NOTE — TELEPHONE ENCOUNTER
"Received VM from lab services stating they needed information from us. Two minutes later got another VM stating they cancelled Elizabeth's TSH drawn on 6/3. They state they \"never received it\"  If you have questions you can call [phone] ref#[id number]  "

## 2024-06-12 NOTE — TELEPHONE ENCOUNTER
I don't know why this was cancelled.  I see it from 6/3.  I put it in again.  Please notify patient she can get this drawn.  Thanks.

## 2024-06-14 ENCOUNTER — APPOINTMENT (OUTPATIENT)
Dept: INTEGRATIVE MEDICINE | Facility: CLINIC | Age: 39
End: 2024-06-14
Payer: COMMERCIAL

## 2024-06-14 DIAGNOSIS — M54.2 NECK PAIN: Primary | ICD-10-CM

## 2024-06-14 DIAGNOSIS — M25.562 LEFT KNEE PAIN, UNSPECIFIED CHRONICITY: ICD-10-CM

## 2024-06-14 DIAGNOSIS — R00.2 PALPITATIONS: ICD-10-CM

## 2024-06-14 DIAGNOSIS — R51.9 NONINTRACTABLE HEADACHE, UNSPECIFIED CHRONICITY PATTERN, UNSPECIFIED HEADACHE TYPE: ICD-10-CM

## 2024-06-14 PROCEDURE — 97810 ACUP 1/> WO ESTIM 1ST 15 MIN: CPT | Performed by: ACUPUNCTURIST

## 2024-06-14 PROCEDURE — 97811 ACUP 1/> W/O ESTIM EA ADD 15: CPT | Performed by: ACUPUNCTURIST

## 2024-06-14 NOTE — PROGRESS NOTES
Acupuncture Visit:     Subjective   Patient ID: Elizabeth Barnes is a 39 y.o. female who presents for Neck Pain, Back Pain, Knee Pain, Headache, and Palpitations    She is having a good week and feeling better in all areas. She is still waiting on her thyroid lab results.     Neck pain and Chronic right shoulder/upper back pain:  good overall.  No pain this week.    (L) knee pain: she is having less pain and less clicking now.  Continuing to work out.  Lifting heavier weights.     Headache: no headaches.     Palpitations: she is not having as many palpitations and not feeling as jittery but she has been getting some mild left sided chest pain this week and messaged her cardiologist to discuss.        Session Information  Is this acupuncture treatment being billed to the patient's insurance company: Yes  Last Treatment date: 06/07/24  Name of Insurance Company: [employer]  Visit Type: Follow-up visit  Medical History Reviewed: I have reviewed pertinent medical history in EHR, and no contraindications are present to provide treatment         Review of Systems  Sleep: good.   Digestion: BM loose and formed   Stress: better  Gyne:  4/22/24   5/14/24  LMP 6/7/24  She had a longer and heavier period than usual.  She had cramping worse on the last day with the least bleeding.  She bled for 7 days and was heaviest CD 3 and 4.  PMS: none         Provider reviewed plan for the acupuncture session, precautions and contraindications. Patient/guardian/hospital staff has given consent to treat with full understanding of what to expect during the session. Before acupuncture began, provider explained to the patient to communicate at any time if the procedure was causing discomfort past their tolerance level. Patient agreed to advise acupuncturist. The acupuncturist counseled the patient on the risks of acupuncture treatment including pain, infection, bleeding, and no relief of pain. The patient was positioned comfortably. There  was no evidence of infection at the site of needle insertions.    Objective   Physical Exam              Acupuncture Treatment  Patient Position: Supine on a table  Acupuncture Needling: Yes  Needle Guage: 36 guage /.20/ Blue seirin, 40 guage /.16/ Red seirin  Body Points: With retention  Body Points - Left: ST 34, SP 10, SP 9, ST 36  Body Points - Bilateral: Du 20, yintang, R 17, R 14, R 12, R 6, FRED 1, DANIAL 14, LI 11, SP 6  Auricular Points: No  Electroacupuncture Used: No  Other Techniques Utilized: Ear seeds, TDP Lamp  Ear Seeds: Stainless steel (shenmen)  TDP Lamp Descripton: abdomen and (L) knee  Needle Count In: 18  Needle Count Out: 18  Needle Retention Time (min): 25 minutes  Total Face to Face Time (min): 25 minutes              Assessment/Plan   Diagnoses and all orders for this visit:  Neck pain  Left knee pain, unspecified chronicity  Nonintractable headache, unspecified chronicity pattern, unspecified headache type  Palpitations

## 2024-06-28 ENCOUNTER — APPOINTMENT (OUTPATIENT)
Dept: INTEGRATIVE MEDICINE | Facility: CLINIC | Age: 39
End: 2024-06-28
Payer: COMMERCIAL

## 2024-06-28 DIAGNOSIS — M25.562 LEFT KNEE PAIN, UNSPECIFIED CHRONICITY: ICD-10-CM

## 2024-06-28 DIAGNOSIS — M54.9 UPPER BACK PAIN ON RIGHT SIDE: ICD-10-CM

## 2024-06-28 DIAGNOSIS — R51.9 NONINTRACTABLE HEADACHE, UNSPECIFIED CHRONICITY PATTERN, UNSPECIFIED HEADACHE TYPE: ICD-10-CM

## 2024-06-28 DIAGNOSIS — M54.2 NECK PAIN: Primary | ICD-10-CM

## 2024-06-28 PROCEDURE — 97811 ACUP 1/> W/O ESTIM EA ADD 15: CPT | Performed by: ACUPUNCTURIST

## 2024-06-28 PROCEDURE — 97810 ACUP 1/> WO ESTIM 1ST 15 MIN: CPT | Performed by: ACUPUNCTURIST

## 2024-06-28 NOTE — PROGRESS NOTES
Acupuncture Visit:     Subjective   Patient ID: Elizabeth Barnes is a 39 y.o. female who presents for Neck Pain, Back Pain, Knee Pain, and Headache    Neck pain and Chronic right shoulder/upper back pain:  doing better with lifting more weights. Feeling stronger and less pain.    (L) knee pain: she has been lifting much heavier weights and doing well.  Clicking and discomfort are decreasing.    Headache:  she has not been getting headaches.     Palpitations: she talked with her cardiologist a couple of times and she is going to wear a monitor for 2 weeks.  She has been getting thumping and chest pain. She is noticing the thumping in the hot weather and when she goes to lay down.          Session Information  Is this acupuncture treatment being billed to the patient's insurance company: Yes  Last Treatment date: 06/14/24  Name of Insurance Company: [employer]  Visit Type: Follow-up visit  Medical History Reviewed: I have reviewed pertinent medical history in EHR, and no contraindications are present to provide treatment         Review of Systems  Sleep: good.   Digestion: BM mix of constipation and loose stool  Stress: better  Gyne:  4/22/24   5/14/24  LMP 6/7/24  She had a painful ovulation last week.  Very painful on 6/20.  Just for about 12 hours.        Provider reviewed plan for the acupuncture session, precautions and contraindications. Patient/guardian/hospital staff has given consent to treat with full understanding of what to expect during the session. Before acupuncture began, provider explained to the patient to communicate at any time if the procedure was causing discomfort past their tolerance level. Patient agreed to advise acupuncturist. The acupuncturist counseled the patient on the risks of acupuncture treatment including pain, infection, bleeding, and no relief of pain. The patient was positioned comfortably. There was no evidence of infection at the site of needle insertions.    Objective    Physical Exam              Acupuncture Treatment  Patient Position: Supine on a table  Acupuncture Needling: Yes  Needle Guage: 40 guage /.16/ Red seirin  Body Points: With retention  Body Points - Left: ST 34, SP 10  Body Points - Bilateral: Du 20, yintang, SJ 5, R 6, SP 15, R 12, R 14, R 17, ST 36, SP 6  Auricular Points: No  Electroacupuncture Used: No  Other Techniques Utilized: Ear seeds, TDP Lamp  Ear Seeds: Stainless steel (shenmen)  TDP Lamp Descripton: (L) knee and abdomen  Needle Count In: 16  Needle Count Out: 16  Needle Retention Time (min): 25 minutes  Total Face to Face Time (min): 25 minutes              Assessment/Plan   Diagnoses and all orders for this visit:  Neck pain  Left knee pain, unspecified chronicity  Nonintractable headache, unspecified chronicity pattern, unspecified headache type  Upper back pain on right side

## 2024-07-12 ENCOUNTER — APPOINTMENT (OUTPATIENT)
Dept: INTEGRATIVE MEDICINE | Facility: CLINIC | Age: 39
End: 2024-07-12
Payer: COMMERCIAL

## 2024-07-12 DIAGNOSIS — M54.9 UPPER BACK PAIN ON RIGHT SIDE: ICD-10-CM

## 2024-07-12 DIAGNOSIS — M25.562 LEFT KNEE PAIN, UNSPECIFIED CHRONICITY: ICD-10-CM

## 2024-07-12 DIAGNOSIS — R51.9 NONINTRACTABLE HEADACHE, UNSPECIFIED CHRONICITY PATTERN, UNSPECIFIED HEADACHE TYPE: ICD-10-CM

## 2024-07-12 DIAGNOSIS — M54.2 NECK PAIN: Primary | ICD-10-CM

## 2024-07-12 NOTE — PROGRESS NOTES
Acupuncture Visit:     Subjective   Patient ID: Elizabeth Barnes is a 39 y.o. female who presents for Neck Pain, Knee Pain, Shoulder Pain, and Headache    Neck pain and Chronic right shoulder/upper back pain:  a little increase in right shoulder pain from lifting.     (L) knee pain: improving more and more.  She is doing heavier weights and eating clean and feeling that is helping.  Much less clicking than a month ago.    Headache:  she has been getting some on and off headaches.     Palpitations: Today is her last day of wearing a heart monitor for 2 weeks.  She has had palpitation episodes off and on.  She will wait to hear from her cardiologist.            Session Information  Is this acupuncture treatment being billed to the patient's insurance company: Yes  Last Treatment date: 06/28/24  Name of Insurance Company: [employer]  Visit Type: Follow-up visit  Medical History Reviewed: I have reviewed pertinent medical history in EHR, and no contraindications are present to provide treatment         Review of Systems  Sleep: good.   Digestion: BM doing well  Stress: better  Gyne:  4/22/24   5/14/24   6/7/24  LMP 7/1/24 She had a 25 day cycle.  Heavy flow this cycle.  She had a lot of period pain on CD 3 with the heavy flow.        Provider reviewed plan for the acupuncture session, precautions and contraindications. Patient/guardian/hospital staff has given consent to treat with full understanding of what to expect during the session. Before acupuncture began, provider explained to the patient to communicate at any time if the procedure was causing discomfort past their tolerance level. Patient agreed to advise acupuncturist. The acupuncturist counseled the patient on the risks of acupuncture treatment including pain, infection, bleeding, and no relief of pain. The patient was positioned comfortably. There was no evidence of infection at the site of needle insertions.    Objective   Physical Exam               Acupuncture Treatment  Patient Position: Supine on a table  Acupuncture Needling: Yes  Needle Guage: 40 guage /.16/ Red seirin  Body Points: With retention  Body Points - Left: ST 34, SP 10  Body Points - Bilateral: Du 20, yintang, LI 4, GB 21, R 17, R 14, SP 15, R 6, ST 36, SP 6  Auricular Points: No  Electroacupuncture Used: No  Other Techniques Utilized: Ear seeds, TDP Lamp  Ear Seeds: Stainless steel (shenmen)  TDP Lamp Descripton: feet and abdomen  Needle Count In: 17  Needle Count Out: 17  Needle Retention Time (min): 25 minutes  Total Face to Face Time (min): 25 minutes              Assessment/Plan   Diagnoses and all orders for this visit:  Neck pain  Upper back pain on right side  Left knee pain, unspecified chronicity  Nonintractable headache, unspecified chronicity pattern, unspecified headache type

## 2024-07-23 ENCOUNTER — LAB (OUTPATIENT)
Dept: LAB | Facility: LAB | Age: 39
End: 2024-07-23
Payer: COMMERCIAL

## 2024-07-23 DIAGNOSIS — E03.9 HYPOTHYROIDISM, UNSPECIFIED TYPE: ICD-10-CM

## 2024-07-23 LAB — TSH SERPL-ACNC: 3.32 MIU/L (ref 0.44–3.98)

## 2024-07-23 PROCEDURE — 36415 COLL VENOUS BLD VENIPUNCTURE: CPT

## 2024-07-23 PROCEDURE — 84443 ASSAY THYROID STIM HORMONE: CPT

## 2024-07-26 ENCOUNTER — APPOINTMENT (OUTPATIENT)
Dept: INTEGRATIVE MEDICINE | Facility: CLINIC | Age: 39
End: 2024-07-26
Payer: COMMERCIAL

## 2024-07-26 DIAGNOSIS — M25.562 LEFT KNEE PAIN, UNSPECIFIED CHRONICITY: ICD-10-CM

## 2024-07-26 DIAGNOSIS — R51.9 NONINTRACTABLE HEADACHE, UNSPECIFIED CHRONICITY PATTERN, UNSPECIFIED HEADACHE TYPE: ICD-10-CM

## 2024-07-26 DIAGNOSIS — M54.9 UPPER BACK PAIN ON LEFT SIDE: ICD-10-CM

## 2024-07-26 DIAGNOSIS — M54.2 NECK PAIN: Primary | ICD-10-CM

## 2024-07-26 DIAGNOSIS — R00.2 PALPITATIONS: ICD-10-CM

## 2024-07-26 PROCEDURE — 97811 ACUP 1/> W/O ESTIM EA ADD 15: CPT | Performed by: ACUPUNCTURIST

## 2024-07-26 PROCEDURE — 97810 ACUP 1/> WO ESTIM 1ST 15 MIN: CPT | Performed by: ACUPUNCTURIST

## 2024-07-26 NOTE — PROGRESS NOTES
Acupuncture Visit:     Subjective   Patient ID: Elizabeth Barnes is a 39 y.o. female who presents for Neck Pain, Back Pain, Knee Pain, and Headache    Neck pain and Chronic right shoulder/upper back pain:  she is having a lot of pain in the left upper back from a pulled muscle.  Pain is going up into the traps and neck.    (L) knee pain: continuing to improve.    Headache:  on and off headaches related to stress.    Palpitations: She is having some chest pain today and thinks it is related to high stress and possibly a pulled muscle in her left back. Pain is on the left side of her chest below the breast and near the armpit.  She had a day without multi-tasking and had no palpitations and no pain and wondering if this is more related to stress than she realized.           Session Information  Is this acupuncture treatment being billed to the patient's insurance company: Yes  Last Treatment date: 07/12/24  Name of Insurance Company: [employer]  Visit Type: Follow-up visit  Medical History Reviewed: I have reviewed pertinent medical history in EHR, and no contraindications are present to provide treatment         Review of Systems  Sleep: okay.   Digestion: BM loose  Stress: better  Gyne:  4/22/24   5/14/24   6/7/24   7/1/24 She thought that she ovulated on CD 14 and then she had a lot of pain on CD 17 and then last week she had cramping again.        Provider reviewed plan for the acupuncture session, precautions and contraindications. Patient/guardian/hospital staff has given consent to treat with full understanding of what to expect during the session. Before acupuncture began, provider explained to the patient to communicate at any time if the procedure was causing discomfort past their tolerance level. Patient agreed to advise acupuncturist. The acupuncturist counseled the patient on the risks of acupuncture treatment including pain, infection, bleeding, and no relief of pain. The patient was positioned  comfortably. There was no evidence of infection at the site of needle insertions.    Objective   Physical Exam              Acupuncture Treatment  Patient Position: Supine on a table  Acupuncture Needling: Yes  Needle Guage: 40 guage /.16/ Red seirin  Body Points: With retention  Body Points - Left: SP 10, ST 34  Body Points - Bilateral: DU 20, yintang, R 17, KD 24, R 14, ST 25, P 6, HT 7, Sp 6, DANIAL 3  Auricular Points: No  Electroacupuncture Used: No  Other Techniques Utilized: Cupping, Ear seeds, TDP Lamp  Cupping Description: running cupping on upper back with massage oil  Ear Seeds: Stainless steel (shenmen)  TDP Lamp Descripton: feet and abdomen  Needle Count In: 18  Needle Count Out: 18  Needle Retention Time (min): 35 minutes  Total Face to Face Time (min): 25 minutes              Assessment/Plan   Diagnoses and all orders for this visit:  Neck pain  Upper back pain on left side  Left knee pain, unspecified chronicity  Nonintractable headache, unspecified chronicity pattern, unspecified headache type  Palpitations

## 2024-08-09 ENCOUNTER — APPOINTMENT (OUTPATIENT)
Dept: INTEGRATIVE MEDICINE | Facility: CLINIC | Age: 39
End: 2024-08-09
Payer: COMMERCIAL

## 2024-08-09 DIAGNOSIS — M54.2 NECK PAIN: Primary | ICD-10-CM

## 2024-08-09 DIAGNOSIS — R00.2 PALPITATIONS: ICD-10-CM

## 2024-08-09 DIAGNOSIS — M25.562 LEFT KNEE PAIN, UNSPECIFIED CHRONICITY: ICD-10-CM

## 2024-08-09 DIAGNOSIS — M54.9 UPPER BACK PAIN ON LEFT SIDE: ICD-10-CM

## 2024-08-09 PROCEDURE — 97810 ACUP 1/> WO ESTIM 1ST 15 MIN: CPT | Performed by: ACUPUNCTURIST

## 2024-08-09 PROCEDURE — 97811 ACUP 1/> W/O ESTIM EA ADD 15: CPT | Performed by: ACUPUNCTURIST

## 2024-08-09 NOTE — PATIENT INSTRUCTIONS
Message:  Dameon Gonzalez,Consider trying this formula for the heart palpitations and digestive complaints. Recommended dosage is 3 pills twice per day before a meal if possible. Let me know if you have any questions.Sharyn  Supplement recommendations:  Gather Vitality (60 tablets) ([employer]): Please take  3 tablets, twice / day (Before food if possible or with food if you forgot to take before your meal. )

## 2024-08-09 NOTE — PROGRESS NOTES
Acupuncture Visit:     Subjective   Patient ID: Elizabeth Barnes is a 39 y.o. female who presents for Neck Pain, Knee Pain, Back Pain, and Palpitations    Neck pain and chronic right shoulder/upper back pain:  cupping was helpful at the last visit but she is continuing to have more pain than usual on the left upper back. Tight and sore.     (L) knee pain: continuing to improve. She was doing squats today without issues.     Headache: doing better while on vacation.    Palpitations:  She had more palpitations before period and with stress.  She had less palpitations after her period.  Her heart monitor testing showed some tachycardia and some skipped beats but nothing that the doctor is able to treat. She can try a beta blocker but not sure if she wants to do that yet.        Session Information  Is this acupuncture treatment being billed to the patient's insurance company: Yes  Last Treatment date: 07/26/24  Name of Insurance Company: [employer]  Visit Type: Follow-up visit  Medical History Reviewed: I have reviewed pertinent medical history in EHR, and no contraindications are present to provide treatment         Review of Systems  Sleep: okay.   Digestion: BM loose  Stress: better  Gyne:  4/22/24   5/14/24   6/7/24   7/1/24 LMP 7/30/24  She had a 30 day cycle.  Her period was more normal and only bled for 3.5 days. She feels that the [employer] is helping.        Provider reviewed plan for the acupuncture session, precautions and contraindications. Patient/guardian/hospital staff has given consent to treat with full understanding of what to expect during the session. Before acupuncture began, provider explained to the patient to communicate at any time if the procedure was causing discomfort past their tolerance level. Patient agreed to advise acupuncturist. The acupuncturist counseled the patient on the risks of acupuncture treatment including pain, infection, bleeding, and no relief of pain. The patient was  positioned comfortably. There was no evidence of infection at the site of needle insertions.    Objective   Physical Exam              Acupuncture Treatment  Patient Position: Supine on a table  Acupuncture Needling: Yes  Needle Guage: 40 guage /.16/ Red seirin  Body Points: With retention  Body Points - Bilateral: Du 20, yintang, HT 7, P 6, R 17, KD 27, R 14, SP 15, R 6, SP 6  Auricular Points: No  Electroacupuncture Used: No  Other Techniques Utilized: Ear seeds, TDP Lamp, Cupping  Cupping Description: running cupping on upper back with massage oil  Ear Seeds: Stainless steel (shenmen)  TDP Lamp Descripton: feet and abdomen  Needle Count In: 15  Needle Count Out: 15  Needle Retention Time (min): 25 minutes  Total Face to Face Time (min): 25 minutes  Chinese Herbal Therapy 1: Evan [employer] Gather Vitality (Ashish Pi Davila) Take 3 pills twice per day.              Assessment/Plan   Diagnoses and all orders for this visit:  Neck pain  Upper back pain on left side  Left knee pain, unspecified chronicity  Palpitations

## 2024-08-16 ENCOUNTER — APPOINTMENT (OUTPATIENT)
Dept: INTEGRATIVE MEDICINE | Facility: CLINIC | Age: 39
End: 2024-08-16
Payer: COMMERCIAL

## 2024-08-16 DIAGNOSIS — M25.562 LEFT KNEE PAIN, UNSPECIFIED CHRONICITY: ICD-10-CM

## 2024-08-16 DIAGNOSIS — M54.9 UPPER BACK PAIN ON LEFT SIDE: ICD-10-CM

## 2024-08-16 DIAGNOSIS — R00.2 PALPITATIONS: ICD-10-CM

## 2024-08-16 DIAGNOSIS — M54.2 NECK PAIN: Primary | ICD-10-CM

## 2024-08-16 PROCEDURE — 97810 ACUP 1/> WO ESTIM 1ST 15 MIN: CPT | Performed by: ACUPUNCTURIST

## 2024-08-16 PROCEDURE — 97811 ACUP 1/> W/O ESTIM EA ADD 15: CPT | Performed by: ACUPUNCTURIST

## 2024-08-16 NOTE — PROGRESS NOTES
Acupuncture Visit:     Subjective   Patient ID: Elizabeth Barnes is a 39 y.o. female who presents for Neck Pain, Back Pain, and Knee Pain    Neck pain and chronic right shoulder/upper back pain: she continues to feel tight and sore on the left neck and upper back. Improving with cupping.     (L) knee pain: she is having more tenderness on her left knee this week and would like to treat it.     Headache: no headaches    Palpitations:  She has not had any heart palpitations this week.  It has been a lower stress week.  She is before ovulation still. Wondering if things will change after ovulation.           Session Information  Is this acupuncture treatment being billed to the patient's insurance company: Yes  Last Treatment date: 08/09/24  Name of Insurance Company: [employer]  Visit Type: Follow-up visit  Medical History Reviewed: I have reviewed pertinent medical history in EHR, and no contraindications are present to provide treatment         Review of Systems  Sleep: doing well.    Digestion: BM no more diarrhea.   Stress: less this week.  Gyne:  4/22/24   5/14/24   6/7/24   7/1/24 LMP 7/30/24  She is on CD 18.        Provider reviewed plan for the acupuncture session, precautions and contraindications. Patient/guardian/hospital staff has given consent to treat with full understanding of what to expect during the session. Before acupuncture began, provider explained to the patient to communicate at any time if the procedure was causing discomfort past their tolerance level. Patient agreed to advise acupuncturist. The acupuncturist counseled the patient on the risks of acupuncture treatment including pain, infection, bleeding, and no relief of pain. The patient was positioned comfortably. There was no evidence of infection at the site of needle insertions.    Objective   Physical Exam              Acupuncture Treatment  Patient Position: Supine on a table  Acupuncture Needling: Yes  Needle Guage: 40 guage  /.16/ Red seirin  Body Points: With retention  Body Points - Left: SP 10, ST 34  Body Points - Bilateral: Du 20, yintang, R 17, R 14, R 6, ST 25, SJ 5, SP 6, GB 21  Auricular Points: No  Electroacupuncture Used: No  Other Techniques Utilized: Ear seeds, TDP Lamp, Cupping  Cupping Description: running cupping on upper back with massage oil  Ear Seeds: Stainless steel (shenmen)  TDP Lamp Descripton: feet and abdomen  Needle Count In: 15  Needle Count Out: 15  Needle Retention Time (min): 25 minutes  Total Face to Face Time (min): 25 minutes              Assessment/Plan   Diagnoses and all orders for this visit:  Neck pain  Upper back pain on left side  Left knee pain, unspecified chronicity  Palpitations

## 2024-08-23 ENCOUNTER — APPOINTMENT (OUTPATIENT)
Dept: INTEGRATIVE MEDICINE | Facility: CLINIC | Age: 39
End: 2024-08-23
Payer: COMMERCIAL

## 2024-08-23 DIAGNOSIS — M25.562 LEFT KNEE PAIN, UNSPECIFIED CHRONICITY: ICD-10-CM

## 2024-08-23 DIAGNOSIS — M54.2 NECK PAIN: Primary | ICD-10-CM

## 2024-08-23 DIAGNOSIS — R51.9 NONINTRACTABLE HEADACHE, UNSPECIFIED CHRONICITY PATTERN, UNSPECIFIED HEADACHE TYPE: ICD-10-CM

## 2024-08-23 DIAGNOSIS — R00.2 PALPITATIONS: ICD-10-CM

## 2024-08-23 DIAGNOSIS — M54.9 UPPER BACK PAIN ON LEFT SIDE: ICD-10-CM

## 2024-08-23 PROCEDURE — 97811 ACUP 1/> W/O ESTIM EA ADD 15: CPT | Performed by: ACUPUNCTURIST

## 2024-08-23 PROCEDURE — 97810 ACUP 1/> WO ESTIM 1ST 15 MIN: CPT | Performed by: ACUPUNCTURIST

## 2024-08-23 NOTE — PROGRESS NOTES
Acupuncture Visit:     Subjective   Patient ID: Elizabeth Barnes is a 39 y.o. female who presents for Neck Pain, Back Pain, Knee Pain, Headache, and Palpitations    Neck pain and chronic right shoulder/upper back pain:  Better overall but the left upper back is feelign tight.  Mild pain.     (L) knee pain: she has felt better this week with her knee pain. Would like to continue to address this.    Headache: she had some headaches with the stormy days.  Pressure headache.     Palpitations:  Heart palpitations have been better.          Session Information  Is this acupuncture treatment being billed to the patient's insurance company: Yes  Last Treatment date: 08/16/24  Name of Insurance Company: [employer]  Visit Type: Follow-up visit  Medical History Reviewed: I have reviewed pertinent medical history in EHR, and no contraindications are present to provide treatment         Review of Systems  Sleep: doing okay.  Digestion: BM no diarrhea.  Formed stool now.   Stress: manageable stress this week.   Gyne:  4/22/24   5/14/24   6/7/24   7/1/24  7/30/24  LMP 08/17/24  Looks like she ovulated around CD 8 and had a 10 day luteal phase.  She didn't take vitex while on vacation.  She did not have any ovulation pain.  She had pain with her period but mild.  Her energy did not dip as much.  Normal amount of flow.        Provider reviewed plan for the acupuncture session, precautions and contraindications. Patient/guardian/hospital staff has given consent to treat with full understanding of what to expect during the session. Before acupuncture began, provider explained to the patient to communicate at any time if the procedure was causing discomfort past their tolerance level. Patient agreed to advise acupuncturist. The acupuncturist counseled the patient on the risks of acupuncture treatment including pain, infection, bleeding, and no relief of pain. The patient was positioned comfortably. There was no evidence of  infection at the site of needle insertions.    Objective   Physical Exam              Acupuncture Treatment  Patient Position: Supine on a table  Acupuncture Needling: Yes  Needle Guage: 40 guage /.16/ Red seirin  Body Points: With retention  Body Points - Left: SP 10, ST 34  Body Points - Bilateral: Du 20, yintang, KD 27, R 17, R 14, R 6, ST 25, SJ 5, SP 6  Auricular Points: No  Electroacupuncture Used: No  Other Techniques Utilized: Ear seeds, TDP Lamp  Ear Seeds: Stainless steel (shenmen)  TDP Lamp Descripton: feet and abdomen  Needle Count In: 15  Needle Count Out: 15  Needle Retention Time (min): 25 minutes  Total Face to Face Time (min): 25 minutes              Assessment/Plan   Diagnoses and all orders for this visit:  Neck pain  Upper back pain on left side  Left knee pain, unspecified chronicity  Nonintractable headache, unspecified chronicity pattern, unspecified headache type  Palpitations

## 2024-08-30 ENCOUNTER — APPOINTMENT (OUTPATIENT)
Dept: INTEGRATIVE MEDICINE | Facility: CLINIC | Age: 39
End: 2024-08-30
Payer: COMMERCIAL

## 2024-08-30 ENCOUNTER — ALLIED HEALTH (OUTPATIENT)
Dept: INTEGRATIVE MEDICINE | Facility: CLINIC | Age: 39
End: 2024-08-30
Payer: COMMERCIAL

## 2024-08-30 DIAGNOSIS — M25.562 LEFT KNEE PAIN, UNSPECIFIED CHRONICITY: ICD-10-CM

## 2024-08-30 DIAGNOSIS — R00.2 PALPITATIONS: ICD-10-CM

## 2024-08-30 DIAGNOSIS — M54.9 UPPER BACK PAIN ON LEFT SIDE: ICD-10-CM

## 2024-08-30 DIAGNOSIS — R51.9 NONINTRACTABLE HEADACHE, UNSPECIFIED CHRONICITY PATTERN, UNSPECIFIED HEADACHE TYPE: ICD-10-CM

## 2024-08-30 DIAGNOSIS — M54.2 NECK PAIN: Primary | ICD-10-CM

## 2024-08-30 PROCEDURE — 97810 ACUP 1/> WO ESTIM 1ST 15 MIN: CPT | Performed by: ACUPUNCTURIST

## 2024-08-30 PROCEDURE — 97811 ACUP 1/> W/O ESTIM EA ADD 15: CPT | Performed by: ACUPUNCTURIST

## 2024-08-30 NOTE — PROGRESS NOTES
Acupuncture Visit:     Subjective   Patient ID: Elizabeth Barnes is a 39 y.o. female who presents for Neck Pain, Knee Pain, and Headache    Neck pain and chronic right shoulder/upper back pain:  good.  No issues this week.  Showing a lot of improvement.     (L) knee pain: would like to continue to work on the left knee because acupuncture has been helping it improve.  Less pain but feeling tightness this week.     Headache: Some headaches this week again - needed medication for the pain. Possibly weather related or stress.    Palpitations:  No heart palpitations this week.            Session Information  Is this acupuncture treatment being billed to the patient's insurance company: Yes  Last Treatment date: 08/23/24  Name of Insurance Company: [place]  Visit Type: Follow-up visit  Medical History Reviewed: I have reviewed pertinent medical history in EHR, and no contraindications are present to provide treatment         Review of Systems  Sleep: a little restless and not getting as much sleep as she would like.  Digestion: BM constipation this week  Stress: manageable  Gyne:  4/22/24   5/14/24   6/7/24   7/1/24  7/30/24 LMP 08/17/24 She thinks that she ovulated yesterday due to tenderness and pain.  Also had discharge.  Temps still low today.   More normal cycle this time with ovulation around CD 13.         Provider reviewed plan for the acupuncture session, precautions and contraindications. Patient/guardian/hospital staff has given consent to treat with full understanding of what to expect during the session. Before acupuncture began, provider explained to the patient to communicate at any time if the procedure was causing discomfort past their tolerance level. Patient agreed to advise acupuncturist. The acupuncturist counseled the patient on the risks of acupuncture treatment including pain, infection, bleeding, and no relief of pain. The patient was positioned comfortably. There was no evidence  of infection at the site of needle insertions.    Objective   Physical Exam              Acupuncture Treatment  Patient Position: Supine on a table  Acupuncture Needling: Yes  Needle Guage: 40 guage /.16/ Red seirin  Body Points: With retention  Body Points - Left: GB 34, SP 10, ST 34  Body Points - Bilateral: Du 20, yintang, SJ 5, R 6, SP 15, R 14, R 17, KD 27, SP 6  Auricular Points: No  Electroacupuncture Used: No  Other Techniques Utilized: Ear seeds, TDP Lamp  Ear Seeds: Stainless steel (shenmen)  TDP Lamp Descripton: feet and abdomen  Needle Count In: 16  Needle Count Out: 16  Needle Retention Time (min): 25 minutes  Total Face to Face Time (min): 25 minutes              Assessment/Plan   Diagnoses and all orders for this visit:  Neck pain  Upper back pain on left side  Left knee pain, unspecified chronicity  Nonintractable headache, unspecified chronicity pattern, unspecified headache type  Palpitations

## 2024-09-06 ENCOUNTER — APPOINTMENT (OUTPATIENT)
Dept: INTEGRATIVE MEDICINE | Facility: CLINIC | Age: 39
End: 2024-09-06
Payer: COMMERCIAL

## 2024-09-06 DIAGNOSIS — R00.2 PALPITATIONS: ICD-10-CM

## 2024-09-06 DIAGNOSIS — R51.9 NONINTRACTABLE HEADACHE, UNSPECIFIED CHRONICITY PATTERN, UNSPECIFIED HEADACHE TYPE: ICD-10-CM

## 2024-09-06 DIAGNOSIS — M54.2 NECK PAIN: Primary | ICD-10-CM

## 2024-09-06 DIAGNOSIS — M25.562 LEFT KNEE PAIN, UNSPECIFIED CHRONICITY: ICD-10-CM

## 2024-09-06 DIAGNOSIS — M54.9 UPPER BACK PAIN ON LEFT SIDE: ICD-10-CM

## 2024-09-06 PROCEDURE — 97811 ACUP 1/> W/O ESTIM EA ADD 15: CPT | Performed by: ACUPUNCTURIST

## 2024-09-06 PROCEDURE — 97810 ACUP 1/> WO ESTIM 1ST 15 MIN: CPT | Performed by: ACUPUNCTURIST

## 2024-09-06 NOTE — PROGRESS NOTES
Acupuncture Visit:     Subjective   Patient ID: Elizabeth Barnes is a 39 y.o. female who presents for Neck Pain, Back Pain, Knee Pain, Headache, and Palpitations    Neck pain and chronic right shoulder/upper back pain:  better this week.  Less pain and less tension.     (L) knee pain: she is feeling less pain but tightness and clicking are still present.      Headache: Less headaches than previous week.  No medication needed.     Palpitations:  She was feeling more anxious this week and had more palpitations this week. Maybe worse palpitations from ovulation to period? She will continue to track this relationship. Also some chest pain last night.           Session Information  Is this acupuncture treatment being billed to the patient's insurance company: Yes  Last Treatment date: 08/30/24  Name of Insurance Company: [employer]  Visit Type: Follow-up visit  Medical History Reviewed: I have reviewed pertinent medical history in EHR, and no contraindications are present to provide treatment         Review of Systems  Sleep: okay  Digestion: BM acupuncture helped the constipation last week.   Gyne:  4/22/24   5/14/24   6/7/24   7/1/24  7/30/24 LMP 08/17/24 She ovulated last week and it is CD 21 today.       Provider reviewed plan for the acupuncture session, precautions and contraindications. Patient/guardian/hospital staff has given consent to treat with full understanding of what to expect during the session. Before acupuncture began, provider explained to the patient to communicate at any time if the procedure was causing discomfort past their tolerance level. Patient agreed to advise acupuncturist. The acupuncturist counseled the patient on the risks of acupuncture treatment including pain, infection, bleeding, and no relief of pain. The patient was positioned comfortably. There was no evidence of infection at the site of needle insertions.    Objective   Physical Exam              Acupuncture  Treatment  Patient Position: Supine on a table  Acupuncture Needling: Yes  Needle Guage: 40 guage /.16/ Red seirin  Body Points: With retention  Body Points - Left: SP 10, ST 36 (Painful and big response on needles so removed after insertion for left knee.)  Body Points - Bilateral: Du 20, yintang, P 6, R 17, KD 27, R 14, R 12, ST 25, R 6, SP 6  Auricular Points: No  Electroacupuncture Used: No  Other Techniques Utilized: Ear seeds, TDP Lamp  Ear Seeds: Stainless steel (shenmen)  TDP Lamp Descripton: feet and abdomen  Needle Count In: 16  Needle Count Out: 16  Needle Retention Time (min): 25 minutes  Total Face to Face Time (min): 25 minutes  Chinese Herbal Therapy 1: Evan [employer]bs Gather Vitality (Ashish Pi Davila) Take 3 pills twice per day.    Acupuncture Evaluation / Recommendation(s) / Follow-up  Post-Treatment Recommendation: Try [employer] Inner Balance for 5 minutes per day this week to help with stress and decrease palpitations.         Assessment/Plan   Diagnoses and all orders for this visit:  Neck pain  Upper back pain on left side  Left knee pain, unspecified chronicity  Nonintractable headache, unspecified chronicity pattern, unspecified headache type  Palpitations

## 2024-09-20 ENCOUNTER — APPOINTMENT (OUTPATIENT)
Dept: INTEGRATIVE MEDICINE | Facility: CLINIC | Age: 39
End: 2024-09-20
Payer: COMMERCIAL

## 2024-09-20 DIAGNOSIS — M54.2 NECK PAIN: Primary | ICD-10-CM

## 2024-09-20 DIAGNOSIS — M54.9 UPPER BACK PAIN ON LEFT SIDE: ICD-10-CM

## 2024-09-20 DIAGNOSIS — M25.562 LEFT KNEE PAIN, UNSPECIFIED CHRONICITY: ICD-10-CM

## 2024-09-20 DIAGNOSIS — R51.9 NONINTRACTABLE HEADACHE, UNSPECIFIED CHRONICITY PATTERN, UNSPECIFIED HEADACHE TYPE: ICD-10-CM

## 2024-09-20 DIAGNOSIS — R00.2 PALPITATIONS: ICD-10-CM

## 2024-09-20 PROCEDURE — 97811 ACUP 1/> W/O ESTIM EA ADD 15: CPT | Performed by: ACUPUNCTURIST

## 2024-09-20 PROCEDURE — 97810 ACUP 1/> WO ESTIM 1ST 15 MIN: CPT | Performed by: ACUPUNCTURIST

## 2024-09-20 NOTE — PROGRESS NOTES
Acupuncture Visit:     Subjective   Patient ID: Elizabeth Barnes is a 39 y.o. female who presents for Neck Pain, Back Pain, Knee Pain, Headache, and Palpitations    Neck pain and chronic right shoulder/upper back pain:  increased stress this week led to more tightness and discomfort in the neck and left upper back.      (L) knee pain: Some tightness and clicking but continuing to see improvement in pain.    Headache: a few headaches this week with increased stress    Palpitations:  She had an increase in heart symptoms this week.  Higher resting heart rate and she feels a little uncomfortable.  Mild palpitations.  All seemed to increased with stress.           Session Information  Is this acupuncture treatment being billed to the patient's insurance company: Yes  Last Treatment date: 09/06/24  Name of Insurance Company: [employer]  Visit Type: Follow-up visit  Medical History Reviewed: I have reviewed pertinent medical history in EHR, and no contraindications are present to provide treatment         Review of Systems  Stress: higher this week  Sleep: some good and some bad due to stress  Digestion: BM constipation   Gyne:   7/30/24  08/17/24  LMP 9/12/24 Today is CD 9.  As she gets toward ovulation she gets more abdominal tenderness. She had a lot of bad cramping this last period.          Provider reviewed plan for the acupuncture session, precautions and contraindications. Patient/guardian/hospital staff has given consent to treat with full understanding of what to expect during the session. Before acupuncture began, provider explained to the patient to communicate at any time if the procedure was causing discomfort past their tolerance level. Patient agreed to advise acupuncturist. The acupuncturist counseled the patient on the risks of acupuncture treatment including pain, infection, bleeding, and no relief of pain. The patient was positioned comfortably. There was no evidence of infection at the site of  needle insertions.    Objective   Physical Exam              Acupuncture Treatment  Patient Position: Supine on a table  Acupuncture Needling: Yes  Needle Guage: 40 guage /.16/ Red seirin  Body Points: With retention  Body Points - Left: SP 10, ST 34  Body Points - Bilateral: Du 20, yintang, GB 21, P 6, R 17, R 14, R 12, SP 15, R 6, ST 36, SP 6  Auricular Points: No  Electroacupuncture Used: No  Other Techniques Utilized: Ear seeds, TDP Lamp  Ear Seeds: Stainless steel (shenmen)  TDP Lamp Descripton: feet and abdomen  Needle Count In: 18  Needle Count Out: 18  Needle Retention Time (min): 25 minutes  Total Face to Face Time (min): 25 minutes              Assessment/Plan   Diagnoses and all orders for this visit:  Neck pain  Upper back pain on left side  Left knee pain, unspecified chronicity  Nonintractable headache, unspecified chronicity pattern, unspecified headache type  Palpitations

## 2024-09-27 ENCOUNTER — APPOINTMENT (OUTPATIENT)
Dept: INTEGRATIVE MEDICINE | Facility: CLINIC | Age: 39
End: 2024-09-27
Payer: COMMERCIAL

## 2024-09-27 DIAGNOSIS — M54.2 NECK PAIN: Primary | ICD-10-CM

## 2024-09-27 DIAGNOSIS — R51.9 NONINTRACTABLE HEADACHE, UNSPECIFIED CHRONICITY PATTERN, UNSPECIFIED HEADACHE TYPE: ICD-10-CM

## 2024-09-27 DIAGNOSIS — R00.2 PALPITATIONS: ICD-10-CM

## 2024-09-27 DIAGNOSIS — M54.9 UPPER BACK PAIN ON LEFT SIDE: ICD-10-CM

## 2024-09-27 DIAGNOSIS — M25.562 LEFT KNEE PAIN, UNSPECIFIED CHRONICITY: ICD-10-CM

## 2024-09-27 PROCEDURE — 97810 ACUP 1/> WO ESTIM 1ST 15 MIN: CPT | Performed by: ACUPUNCTURIST

## 2024-09-27 PROCEDURE — 97811 ACUP 1/> W/O ESTIM EA ADD 15: CPT | Performed by: ACUPUNCTURIST

## 2024-09-27 NOTE — PROGRESS NOTES
Acupuncture Visit:     Subjective   Patient ID: Elizabeth Barnes is a 39 y.o. female who presents for Neck Pain, Back Pain, Headache, Knee Pain, and Palpitations    Neck pain and chronic right shoulder/upper back pain:  better this week.  Less pain and less tension.     (L) knee pain: feeling good overall.  Clicking continues to decrease.     Headache: last weekend she had a lot of headaches - possibly stress related.      Palpitations:  a little bit of palpitations and chest discomfort this week.          Session Information  Is this acupuncture treatment being billed to the patient's insurance company: Yes  Last Treatment date: 09/20/24  Name of Insurance Company: [employer]  Visit Type: Follow-up visit  Medical History Reviewed: I have reviewed pertinent medical history in EHR, and no contraindications are present to provide treatment         Review of Systems  Sleep: okay  Digestion: BM constipation with travel but better now that she is home.  Gyne:  She ovulated on Tuesday. She had a lot of cramping. She is scheduled for an ultrasound on Monday.    Stress: stress has been much higher overall.        Provider reviewed plan for the acupuncture session, precautions and contraindications. Patient/guardian/hospital staff has given consent to treat with full understanding of what to expect during the session. Before acupuncture began, provider explained to the patient to communicate at any time if the procedure was causing discomfort past their tolerance level. Patient agreed to advise acupuncturist. The acupuncturist counseled the patient on the risks of acupuncture treatment including pain, infection, bleeding, and no relief of pain. The patient was positioned comfortably. There was no evidence of infection at the site of needle insertions.    Objective   Physical Exam              Acupuncture Treatment  Patient Position: Supine on a table  Acupuncture Needling: Yes  Needle Guage: 40 guage /.16/ Red seirin  Body  Points: With retention  Body Points - Left: SP 10, ST 34  Body Points - Bilateral: Du 20, yintang, R 17, R 12, ST 25, SP 15, R 6, P 6, SP 6, GB 21  Auricular Points: No  Electroacupuncture Used: No  Other Techniques Utilized: Ear seeds, TDP Lamp  Ear Seeds: Stainless steel (shenmen)  TDP Lamp Descripton: feet and abdomen  Needle Count In: 17  Needle Count Out: 17  Needle Retention Time (min): 25 minutes  Total Face to Face Time (min): 25 minutes              Assessment/Plan   Diagnoses and all orders for this visit:  Neck pain  Upper back pain on left side  Left knee pain, unspecified chronicity  Nonintractable headache, unspecified chronicity pattern, unspecified headache type  Palpitations

## 2024-10-04 ENCOUNTER — APPOINTMENT (OUTPATIENT)
Dept: INTEGRATIVE MEDICINE | Facility: CLINIC | Age: 39
End: 2024-10-04
Payer: COMMERCIAL

## 2024-10-04 DIAGNOSIS — M25.562 LEFT KNEE PAIN, UNSPECIFIED CHRONICITY: ICD-10-CM

## 2024-10-04 DIAGNOSIS — R00.2 PALPITATIONS: ICD-10-CM

## 2024-10-04 DIAGNOSIS — R51.9 NONINTRACTABLE HEADACHE, UNSPECIFIED CHRONICITY PATTERN, UNSPECIFIED HEADACHE TYPE: ICD-10-CM

## 2024-10-04 DIAGNOSIS — M54.9 UPPER BACK PAIN ON LEFT SIDE: ICD-10-CM

## 2024-10-04 DIAGNOSIS — M54.2 NECK PAIN: Primary | ICD-10-CM

## 2024-10-04 PROCEDURE — 97810 ACUP 1/> WO ESTIM 1ST 15 MIN: CPT | Performed by: ACUPUNCTURIST

## 2024-10-04 PROCEDURE — 97811 ACUP 1/> W/O ESTIM EA ADD 15: CPT | Performed by: ACUPUNCTURIST

## 2024-10-04 NOTE — PROGRESS NOTES
Acupuncture Visit:     Subjective   Patient ID: Elizabeth Barnes is a 39 y.o. female who presents for Neck Pain, Back Pain, Knee Pain, Headache, and Palpitations    Overall less stress this week and less discomfort.     Neck pain and chronic right shoulder/upper back pain:  tension is lower with less stress.  Very little pain.    (L) knee pain: continuing to improve.  Less and less clicking with deep squats.     Headache: no headaches this week with less stress.    Palpitations:  she is noticing some palpitations again in the luteal phase. More aware of heartbeat.            Session Information  Is this acupuncture treatment being billed to the patient's insurance company: Yes  Last Treatment date: 09/27/24  Name of Insurance Company: [employer]  Visit Type: Follow-up visit  Medical History Reviewed: I have reviewed pertinent medical history in EHR, and no contraindications are present to provide treatment         Review of Systems  Sleep: sleeping well  Digestion: BM formed and easy to pass and daily BM lately.  Gyne:  Today is CD 23      Stress: better this week.        Provider reviewed plan for the acupuncture session, precautions and contraindications. Patient/guardian/hospital staff has given consent to treat with full understanding of what to expect during the session. Before acupuncture began, provider explained to the patient to communicate at any time if the procedure was causing discomfort past their tolerance level. Patient agreed to advise acupuncturist. The acupuncturist counseled the patient on the risks of acupuncture treatment including pain, infection, bleeding, and no relief of pain. The patient was positioned comfortably. There was no evidence of infection at the site of needle insertions.    Objective   Physical Exam              Acupuncture Treatment  Patient Position: Supine on a table  Acupuncture Needling: Yes  Needle Guage: 40 guage /.16/ Red seirin  Body Points: With retention  Body Points -  Bilateral: Du 20, yintang, R 17, R 12, R 6, Sp 15, P 6, HT 7, ST 36, SP 6  Auricular Points: No  Electroacupuncture Used: No  Other Techniques Utilized: Ear seeds, TDP Lamp  Ear Seeds: Stainless steel (urban)  TDP Lamp Descripton: feet and abdomen  Needle Count In: 15  Needle Count Out: 15  Needle Retention Time (min): 25 minutes  Total Face to Face Time (min): 25 minutes              Assessment/Plan   Diagnoses and all orders for this visit:  Neck pain  Upper back pain on left side  Left knee pain, unspecified chronicity  Nonintractable headache, unspecified chronicity pattern, unspecified headache type  Palpitations

## 2024-10-11 ENCOUNTER — APPOINTMENT (OUTPATIENT)
Dept: INTEGRATIVE MEDICINE | Facility: CLINIC | Age: 39
End: 2024-10-11
Payer: COMMERCIAL

## 2024-10-11 DIAGNOSIS — R53.83 OTHER FATIGUE: ICD-10-CM

## 2024-10-11 DIAGNOSIS — M54.2 NECK PAIN: Primary | ICD-10-CM

## 2024-10-11 PROCEDURE — 97810 ACUP 1/> WO ESTIM 1ST 15 MIN: CPT | Performed by: ACUPUNCTURIST

## 2024-10-11 PROCEDURE — 97811 ACUP 1/> W/O ESTIM EA ADD 15: CPT | Performed by: ACUPUNCTURIST

## 2024-10-11 NOTE — PROGRESS NOTES
Acupuncture Visit:     Subjective   Patient ID: Elizabeth Barnes is a 39 y.o. female who presents for Neck Pain, Back Pain, and Fatigue    Fatigue: She is feeling more tired this week after the flu and COVID shot on Monday.  She is feeling really tired by lunch time.  She also had her period this week which causes fatigue.     Neck pain and chronic right shoulder/upper back pain:  mild tension in the neck and upper back this week but better since stress is lower and resting more.     (L) knee pain: she is feeling better in her knee and did less leg workouts this week so it has had more rest. Still some clicking.    Headache: no headaches. Doing very well with managing headaches with acupuncture.    Palpitations:  Heart palpitations has been better this week. She had noticed an increase in her luteal phase again last cycle and better with period.          Session Information  Is this acupuncture treatment being billed to the patient's insurance company: Yes  Last Treatment date: 10/04/24  Name of Insurance Company: [employer]  Visit Type: Follow-up visit  Medical History Reviewed: I have reviewed pertinent medical history in EHR, and no contraindications are present to provide treatment         Review of Systems  Sleep: sleeping well but not enough.  She is working on eating earlier in the evening and noticing that helps her sleep quality  Digestion: BM no issues.   Gyne:  LMP 10/7/24 Today is CD 5.  She is done bleeding now.  Bleeding was less but pain was more and she needed naproxen.  She is having no pain in the abdomen.     Stress: not bad this week.        Provider reviewed plan for the acupuncture session, precautions and contraindications. Patient/guardian/hospital staff has given consent to treat with full understanding of what to expect during the session. Before acupuncture began, provider explained to the patient to communicate at any time if the procedure was causing discomfort past their tolerance level.  Patient agreed to advise acupuncturist. The acupuncturist counseled the patient on the risks of acupuncture treatment including pain, infection, bleeding, and no relief of pain. The patient was positioned comfortably. There was no evidence of infection at the site of needle insertions.    Objective   Physical Exam              Acupuncture Treatment  Patient Position: Supine on a table  Acupuncture Needling: Yes  Needle Guage: 40 guage /.16/ Red seirin  Body Points: With retention  Body Points - Bilateral: Du 20, yintang, P 6, R 17, R 12, ST 25, R 6, ST 36  Auricular Points: No  Electroacupuncture Used: No  Other Techniques Utilized: Ear seeds, TDP Lamp  Ear Seeds: Stainless steel (shenmen)  TDP Lamp Descripton: feet and abdomen  Needle Count In: 11  Needle Count Out: 11  Needle Retention Time (min): 25 minutes  Total Face to Face Time (min): 25 minutes              Assessment/Plan   Diagnoses and all orders for this visit:  Neck pain  Other fatigue

## 2024-10-18 ENCOUNTER — APPOINTMENT (OUTPATIENT)
Dept: INTEGRATIVE MEDICINE | Facility: CLINIC | Age: 39
End: 2024-10-18
Payer: COMMERCIAL

## 2024-10-18 DIAGNOSIS — R00.2 PALPITATIONS: ICD-10-CM

## 2024-10-18 DIAGNOSIS — R51.9 NONINTRACTABLE HEADACHE, UNSPECIFIED CHRONICITY PATTERN, UNSPECIFIED HEADACHE TYPE: ICD-10-CM

## 2024-10-18 DIAGNOSIS — M25.562 LEFT KNEE PAIN, UNSPECIFIED CHRONICITY: ICD-10-CM

## 2024-10-18 DIAGNOSIS — N94.6 DYSMENORRHEA: ICD-10-CM

## 2024-10-18 DIAGNOSIS — M54.2 NECK PAIN: Primary | ICD-10-CM

## 2024-10-18 PROCEDURE — 97810 ACUP 1/> WO ESTIM 1ST 15 MIN: CPT | Performed by: ACUPUNCTURIST

## 2024-10-18 PROCEDURE — 97811 ACUP 1/> W/O ESTIM EA ADD 15: CPT | Performed by: ACUPUNCTURIST

## 2024-10-18 NOTE — PROGRESS NOTES
Acupuncture Visit:     Subjective   Patient ID: Elizabeth Barnes is a 39 y.o. female who presents for Neck Pain, Knee Pain, Headache, Palpitations, and Dysmenorrhea    Neck pain and chronic right shoulder/upper back pain:  less pain and tension this week.  Stress has been lower.       (L) knee pain: continuing to see improvement in the left knee even with increasing exercise.    Headache: no headaches. Doing very well with managing headaches with acupuncture.    Palpitations:  She is more aware of her chest this week but not bad. Wondering if it is related to changes in hormones as she nears ovulation.  No increase in stress.     She is noticing that she feels run down on the evenings of the days that she lifts weights and exercises.  In general fatigue has been improving since last visit.           Session Information  Is this acupuncture treatment being billed to the patient's insurance company: Yes  Last Treatment date: 10/11/24  Name of Insurance Company: [employer]  Visit Type: Follow-up visit  Medical History Reviewed: I have reviewed pertinent medical history in EHR, and no contraindications are present to provide treatment         Review of Systems  Sleep: so so.  Not bad but not great.  Waking up more frequently.  Digestion: BM doing well with regular acupuncture  Gyne:  LMP 10/7/24 Today is CD 12.  She had some cramping yesterday - sensitivity on the left side.  Discharge starting too.     Stress: low this week.        Provider reviewed plan for the acupuncture session, precautions and contraindications. Patient/guardian/hospital staff has given consent to treat with full understanding of what to expect during the session. Before acupuncture began, provider explained to the patient to communicate at any time if the procedure was causing discomfort past their tolerance level. Patient agreed to advise acupuncturist. The acupuncturist counseled the patient on the risks of acupuncture treatment including pain,  infection, bleeding, and no relief of pain. The patient was positioned comfortably. There was no evidence of infection at the site of needle insertions.    Objective   Physical Exam              Acupuncture Treatment  Patient Position: Supine on a table  Acupuncture Needling: Yes  Needle Guage: 40 guage /.16/ Red seirin  Body Points: With retention  Body Points - Bilateral: Du 20, yintang, P 6, R 17, R 12, SP 15, R 6, zigong, GB 34, SP 6  Auricular Points: No  Electroacupuncture Used: No  Other Techniques Utilized: Ear seeds, TDP Lamp  Ear Seeds: Stainless steel (shenmen)  TDP Lamp Descripton: feet and abdomen  Needle Count In: 15  Needle Count Out: 15  Needle Retention Time (min): 25 minutes  Total Face to Face Time (min): 25 minutes              Assessment/Plan   Diagnoses and all orders for this visit:  Neck pain  Left knee pain, unspecified chronicity  Nonintractable headache, unspecified chronicity pattern, unspecified headache type  Palpitations  Dysmenorrhea

## 2024-10-25 ENCOUNTER — APPOINTMENT (OUTPATIENT)
Dept: INTEGRATIVE MEDICINE | Facility: CLINIC | Age: 39
End: 2024-10-25
Payer: COMMERCIAL

## 2024-10-25 DIAGNOSIS — M54.2 NECK PAIN: Primary | ICD-10-CM

## 2024-10-25 DIAGNOSIS — R51.9 NONINTRACTABLE HEADACHE, UNSPECIFIED CHRONICITY PATTERN, UNSPECIFIED HEADACHE TYPE: ICD-10-CM

## 2024-10-25 DIAGNOSIS — R00.2 PALPITATIONS: ICD-10-CM

## 2024-10-25 PROCEDURE — 97811 ACUP 1/> W/O ESTIM EA ADD 15: CPT | Performed by: ACUPUNCTURIST

## 2024-10-25 PROCEDURE — 97810 ACUP 1/> WO ESTIM 1ST 15 MIN: CPT | Performed by: ACUPUNCTURIST

## 2024-10-25 NOTE — PROGRESS NOTES
Acupuncture Visit:     Subjective   Patient ID: Elizabeth Barnes is a 39 y.o. female who presents for Neck Pain, Back Pain, Headache, and Palpitations    Neck pain and chronic right shoulder/upper back pain:  some tension but very little pain this week.  Acupuncture helps to lower the tension in her body and pain.    Headache: one headache this week from stress.    Palpitations:  she is in the luteal phase but not noticing an increase in palpitations this time.          Session Information  Is this acupuncture treatment being billed to the patient's insurance company: Yes  Last Treatment date: 10/18/24  Name of Insurance Company: [employer]  Visit Type: Follow-up visit  Medical History Reviewed: I have reviewed pertinent medical history in EHR, and no contraindications are present to provide treatment         Review of Systems  Sleep: some good and some bad nights.  Digestion: BM no issues  Gyne:  LMP 10/7/24 Today is CD 19.  Stress: moderate       Provider reviewed plan for the acupuncture session, precautions and contraindications. Patient/guardian/hospital staff has given consent to treat with full understanding of what to expect during the session. Before acupuncture began, provider explained to the patient to communicate at any time if the procedure was causing discomfort past their tolerance level. Patient agreed to advise acupuncturist. The acupuncturist counseled the patient on the risks of acupuncture treatment including pain, infection, bleeding, and no relief of pain. The patient was positioned comfortably. There was no evidence of infection at the site of needle insertions.    Objective   Physical Exam              Acupuncture Treatment  Patient Position: Supine on a table  Acupuncture Needling: Yes  Needle Guage: 40 guage /.16/ Red seirin  Body Points: With retention  Body Points - Bilateral: Du 20, yintang, R 17, R 14, ST 25, R 6, P 6, ST 36, SP 6  Auricular Points: No  Electroacupuncture Used: No  Other  Techniques Utilized: Ear seeds, TDP Lamp  Ear Seeds: Stainless steel (shenmen)  TDP Lamp Descripton: feet and abdomen  Needle Count In: 13  Needle Count Out: 13  Needle Retention Time (min): 25 minutes  Total Face to Face Time (min): 25 minutes              Assessment/Plan   Diagnoses and all orders for this visit:  Neck pain  Nonintractable headache, unspecified chronicity pattern, unspecified headache type  Palpitations

## 2024-10-31 ENCOUNTER — APPOINTMENT (OUTPATIENT)
Dept: INTEGRATIVE MEDICINE | Facility: CLINIC | Age: 39
End: 2024-10-31
Payer: COMMERCIAL

## 2024-10-31 DIAGNOSIS — M54.2 NECK PAIN: Primary | ICD-10-CM

## 2024-10-31 DIAGNOSIS — R51.9 NONINTRACTABLE HEADACHE, UNSPECIFIED CHRONICITY PATTERN, UNSPECIFIED HEADACHE TYPE: ICD-10-CM

## 2024-10-31 DIAGNOSIS — M25.562 LEFT KNEE PAIN, UNSPECIFIED CHRONICITY: ICD-10-CM

## 2024-10-31 DIAGNOSIS — R00.2 PALPITATIONS: ICD-10-CM

## 2024-10-31 PROCEDURE — 97810 ACUP 1/> WO ESTIM 1ST 15 MIN: CPT | Performed by: ACUPUNCTURIST

## 2024-10-31 PROCEDURE — 97811 ACUP 1/> W/O ESTIM EA ADD 15: CPT | Performed by: ACUPUNCTURIST

## 2024-10-31 NOTE — PROGRESS NOTES
Acupuncture Visit:     Subjective   Patient ID: Elizabeth Barnes is a 39 y.o. female who presents for Neck Pain, Back Pain, Knee Pain, Headache, and Palpitations    Neck pain and chronic right shoulder/upper back pain:  doing very well this week.  Her stress has been lower and she is feeling less pain and tension.    (L) knee pain: she is exercising and lifting weights with no pain now.    Headache: no headaches this week.     Palpitations:  overall less this cycle - even in the luteal phase.           Session Information  Is this acupuncture treatment being billed to the patient's insurance company: Yes  Last Treatment date: 10/25/24  Name of Insurance Company: [employer]  Visit Type: Follow-up visit  Medical History Reviewed: I have reviewed pertinent medical history in EHR, and no contraindications are present to provide treatment         Review of Systems  Sleep: okay.  Some good and some bad nights.     Digestion: BM doing well.  Regular BM.    Gyne:  LMP 10/7/24 LMP 10/31/24  She had a 25 day cycle. She is having some cramping and took Naproxen.     Stress: low this week.        Provider reviewed plan for the acupuncture session, precautions and contraindications. Patient/guardian/hospital staff has given consent to treat with full understanding of what to expect during the session. Before acupuncture began, provider explained to the patient to communicate at any time if the procedure was causing discomfort past their tolerance level. Patient agreed to advise acupuncturist. The acupuncturist counseled the patient on the risks of acupuncture treatment including pain, infection, bleeding, and no relief of pain. The patient was positioned comfortably. There was no evidence of infection at the site of needle insertions.    Objective   Physical Exam              Acupuncture Treatment  Patient Position: Supine on a table  Acupuncture Needling: Yes  Needle Guage: 40 guage /.16/ Red seirin  Body Points: With  retention  Body Points - Bilateral: Du 20, yintang, R 17, R 12, ST 25, zigong, R 6, P 6, HT 7, GB 34, SP 6  Auricular Points: No  Electroacupuncture Used: No  Other Techniques Utilized: Ear seeds, TDP Lamp  Ear Seeds: Stainless steel (shenmen)  TDP Lamp Descripton: feet and abdomen  Needle Count In: 17  Needle Count Out: 17  Needle Retention Time (min): 25 minutes  Total Face to Face Time (min): 25 minutes              Assessment/Plan   Diagnoses and all orders for this visit:  Neck pain  Nonintractable headache, unspecified chronicity pattern, unspecified headache type  Left knee pain, unspecified chronicity  Palpitations

## 2024-11-01 ENCOUNTER — APPOINTMENT (OUTPATIENT)
Dept: INTEGRATIVE MEDICINE | Facility: CLINIC | Age: 39
End: 2024-11-01
Payer: COMMERCIAL

## 2024-11-08 ENCOUNTER — APPOINTMENT (OUTPATIENT)
Dept: INTEGRATIVE MEDICINE | Facility: CLINIC | Age: 39
End: 2024-11-08
Payer: COMMERCIAL

## 2024-11-08 DIAGNOSIS — N94.6 DYSMENORRHEA: ICD-10-CM

## 2024-11-08 DIAGNOSIS — R51.9 NONINTRACTABLE HEADACHE, UNSPECIFIED CHRONICITY PATTERN, UNSPECIFIED HEADACHE TYPE: ICD-10-CM

## 2024-11-08 DIAGNOSIS — M25.562 LEFT KNEE PAIN, UNSPECIFIED CHRONICITY: ICD-10-CM

## 2024-11-08 DIAGNOSIS — M54.2 NECK PAIN: Primary | ICD-10-CM

## 2024-11-08 DIAGNOSIS — R00.2 PALPITATIONS: ICD-10-CM

## 2024-11-08 PROCEDURE — 97810 ACUP 1/> WO ESTIM 1ST 15 MIN: CPT | Performed by: ACUPUNCTURIST

## 2024-11-08 PROCEDURE — 97811 ACUP 1/> W/O ESTIM EA ADD 15: CPT | Performed by: ACUPUNCTURIST

## 2024-11-08 NOTE — PROGRESS NOTES
Acupuncture Visit:     Subjective   Patient ID: Elizabeth Barnes is a 39 y.o. female who presents for Neck Pain, Back Pain, Headache, Palpitations, and Stress    Overall her body is feeling good with less pain.  She has been caretaking a lot with sick kiddos and .  Stress has been higher.  She has a cold sore which shows her that her immune system is stressed.     Neck pain and chronic right shoulder/upper back pain: slightly increased neck and upper back pain with caring for sick kiddos    (L) knee pain: no issues with her knee this week.     Headache: no headaches this week.    Palpitations:  she had increased stress but no increase in palpitations.         Session Information  Is this acupuncture treatment being billed to the patient's insurance company: Yes  Last Treatment date: 10/31/24  Name of Insurance Company: [employer]  Visit Type: Follow-up visit  Medical History Reviewed: I have reviewed pertinent medical history in EHR, and no contraindications are present to provide treatment         Review of Systems  Sleep: poor sleep this week due to sick kiddos at home.    Digestion: BM daily and no issues.     Gyne:   10/7/24 LMP 10/31/24  Today is CD 9.  No pain yet since her period. She is taking Vitex Berry again to see if that will be helpful.     Stress: high this week.        Provider reviewed plan for the acupuncture session, precautions and contraindications. Patient/guardian/hospital staff has given consent to treat with full understanding of what to expect during the session. Before acupuncture began, provider explained to the patient to communicate at any time if the procedure was causing discomfort past their tolerance level. Patient agreed to advise acupuncturist. The acupuncturist counseled the patient on the risks of acupuncture treatment including pain, infection, bleeding, and no relief of pain. The patient was positioned comfortably. There was no evidence of infection at the site of  needle insertions.    Objective   Physical Exam              Acupuncture Treatment  Patient Position: Supine on a table  Acupuncture Needling: Yes  Needle Guage: 40 guage /.16/ Red seirin  Body Points: With retention  Body Points - Bilateral: Du 20, yintang, R 17, P 6, R 12, ST 25, R 6, zigong, ST 36, SP 6  Auricular Points: No  Electroacupuncture Used: No  Other Techniques Utilized: Ear seeds, TDP Lamp  Ear Seeds: Stainless steel (shenmen)  TDP Lamp Descripton: feet and abdomen  Needle Count In: 15  Needle Count Out: 15  Needle Retention Time (min): 25 minutes  Total Face to Face Time (min): 25 minutes              Assessment/Plan   Diagnoses and all orders for this visit:  Neck pain  Nonintractable headache, unspecified chronicity pattern, unspecified headache type  Left knee pain, unspecified chronicity  Palpitations  Dysmenorrhea

## 2024-11-11 NOTE — PROGRESS NOTES
Acupuncture Visit:     Subjective   Patient ID: Elizabeth Barnes is a 38 y.o. female who presents for Neck Pain, Shoulder Pain, and Stress    Neck pain and Chronic right shoulder pain: she has been having increased pain in the right side of her neck that travels into the right trap.  Shoulder is mildly sore. She thinks the neck pain is from computer use.   Pain travels into the right medial scapula too.    (R) finger pain:  when she applies pressure to the finger tips that were injured.  No more numbness.    Sleep and stress are worse without acupuncture - wakiing more in the night and anxiety higher.     Cardiology appointment: EKG was normal and ECHO was normal results. She needs to talk with her doctor about the results.     Breastfeeding: pumping twice per day and supply low.             Session Information  Is this acupuncture treatment being billed to the patient's insurance company: Yes  Last Treatment date: 12/22/23  Name of Insurance Company: [employer]  Visit Type: Follow-up visit  Medical History Reviewed: I have reviewed pertinent medical history in EHR, and no contraindications are present to provide treatment         Review of Systems  Sleep: not as good lately  Digestion: doing well.   Stress: high   Gyne: LMP 1/17/24 Heavier period than pre-pregnancy.  Mild cramping.  Maybe mild PMS.  Heaviness in lower abdomen day before        Provider reviewed plan for the acupuncture session, precautions and contraindications. Patient/guardian/hospital staff has given consent to treat with full understanding of what to expect during the session. Before acupuncture began, provider explained to the patient to communicate at any time if the procedure was causing discomfort past their tolerance level. Patient agreed to advise acupuncturist. The acupuncturist counseled the patient on the risks of acupuncture treatment including pain, infection, bleeding, and no relief of pain. The patient was positioned  DISCHARGE INSTRUCTIONS AFTER HAND FRACTURE    ACTIVITY: The first 48 hours following surgery is the time to rest and to be waited on. Swelling and discomfort is best controlled by elevating the arm and reducing activity. The splint is in place to protect the fracture repair. Lifting, pushing, grasping, pulling and other use of the affected extremity is prohibited until seen by Dr. Harkins.       MEDICATIONS:   Anesthetic was injected into your hand during the procedure.  Therefore, numbness of most if not all of your fingers is likely for 8-16 hours after surgery.  Despite this, you should take pain your medication to avoid pain later in the evening.  Your routine medications can be re-started unless otherwise specified.  Narcotic pain medications are not prescribed for carpal tunnel release as they often cause nausea, confusion, and constipation while not completely relieving pain.  Instead, if you medical history supports use of tylenol and then add tramadol if needed.      CARING FOR THE INCISION: A large, bulky fiberglass splint was applied after surgery to protect the fracture. Please keep it in place until removed by Dr. Harkins or the hand therapist.  Do not soak the incision until all sutures have been removed.     HAND THERAPY:  Your recovery will be significantly improved by participating in hand therapy.  A referral has been submitted for you.  You must work with the hand therapist to maximize function after the injury.    DON’T WORRY: It is common to experience swelling and bruising following surgery. Healing after hand surgery, especially fractures, takes time. You may notice intermittent stiffness and swelling within the hand for several months after surgery. The hand therapist may continue to work with you for several months. Your long term outcome is directly related to the severity of the injury and your diligent compliance with the therapist.     AVOID SMOKING: Complications from surgery are increased in  comfortably. There was no evidence of infection at the site of needle insertions.    Objective   Physical Exam              Acupuncture Treatment  Patient Position: Supine on a table  Acupuncture Needling: Yes  Needle Guage: 36 guage /.20/ Blue seirin  Body Points: With retention  Body Points - Bilateral: Du 20, GB 21, P 6, ST 36, SP 9, ST 40, yintang, LI 20  Auricular Points: No  Other Techniques Utilized: Ear seeds, TDP Lamp, Cupping  Cupping Description: running cupping on upper back  Ear Seeds: Stainless steel (shenmen)  TDP Lamp Descripton: feet and abdomen  Needle Count In: 14  Needle Count Out: 14  Needle Retention Time (min): 25 minutes  Total Face to Face Time (min): 25 minutes              Assessment/Plan   Diagnoses and all orders for this visit:  Neck pain  Chronic pain of right upper extremity       those patients who smoke prior to or following surgery.    FOLLOW UP: Please call my office the first weekday after your operation and schedule an appointment 2 weeks after your surgery date.     X-RAYS:  You should expect to obtain many x-rays after repair of a hand fracture.  You must arrive to the radiology suite at least 30 minutes before each clinic visit in order to get the X-ray in time for your appointment.      UNEXPECTED EVENTS: If you notice a sudden increase in swelling, pain or fever; contact us at [phone]. After hours we may be reached by calling [phone] and asking the  to contact Dr. Harkins.

## 2024-11-15 ENCOUNTER — APPOINTMENT (OUTPATIENT)
Dept: INTEGRATIVE MEDICINE | Facility: CLINIC | Age: 39
End: 2024-11-15
Payer: COMMERCIAL

## 2024-11-15 DIAGNOSIS — J06.9 UPPER RESPIRATORY TRACT INFECTION, UNSPECIFIED TYPE: ICD-10-CM

## 2024-11-15 DIAGNOSIS — R53.83 OTHER FATIGUE: ICD-10-CM

## 2024-11-15 DIAGNOSIS — M54.2 NECK PAIN: Primary | ICD-10-CM

## 2024-11-15 DIAGNOSIS — R51.9 NONINTRACTABLE HEADACHE, UNSPECIFIED CHRONICITY PATTERN, UNSPECIFIED HEADACHE TYPE: ICD-10-CM

## 2024-11-15 PROCEDURE — 97811 ACUP 1/> W/O ESTIM EA ADD 15: CPT | Performed by: ACUPUNCTURIST

## 2024-11-15 PROCEDURE — 97810 ACUP 1/> WO ESTIM 1ST 15 MIN: CPT | Performed by: ACUPUNCTURIST

## 2024-11-22 ENCOUNTER — APPOINTMENT (OUTPATIENT)
Dept: INTEGRATIVE MEDICINE | Facility: CLINIC | Age: 39
End: 2024-11-22
Payer: COMMERCIAL

## 2024-11-22 DIAGNOSIS — J06.9 UPPER RESPIRATORY TRACT INFECTION, UNSPECIFIED TYPE: ICD-10-CM

## 2024-11-22 DIAGNOSIS — M54.9 UPPER BACK PAIN: ICD-10-CM

## 2024-11-22 DIAGNOSIS — M54.2 NECK PAIN: Primary | ICD-10-CM

## 2024-11-22 DIAGNOSIS — R51.9 NONINTRACTABLE HEADACHE, UNSPECIFIED CHRONICITY PATTERN, UNSPECIFIED HEADACHE TYPE: ICD-10-CM

## 2024-11-22 NOTE — PROGRESS NOTES
Acupuncture Visit:     Subjective   Patient ID: Elizabeth Barnes is a 39 y.o. female who presents for Neck Pain, Back Pain, URI, and Headache    Pneumonia: she is continuing to struggle with a URI.  Her fevers have not completely resolved though improved.  Coughing is still there.  Fatigue, poor appetite, headaches.  She saw her doctor again today and will start a new antibiotic today.    Neck pain and chronic right shoulder/upper back pain:  continuing to have more pain in the neck and upper back due to coughing.    (L) knee pain: no issues with resting due to illness.     Headache: headaches with coughing and URI.    Palpitations: not noticing palpitations this week.         Session Information  Is this acupuncture treatment being billed to the patient's insurance company: Yes  Last Treatment date: 11/15/24  Name of Insurance Company: [employer]  Visit Type: Follow-up visit  Medical History Reviewed: I have reviewed pertinent medical history in EHR, and no contraindications are present to provide treatment         Review of Systems  Sleep: poor sleep with coughing.    Digestion: poor appetite    Gyne:   10/7/24 LMP 10/31/24    Stress: moderate       Provider reviewed plan for the acupuncture session, precautions and contraindications. Patient/guardian/hospital staff has given consent to treat with full understanding of what to expect during the session. Before acupuncture began, provider explained to the patient to communicate at any time if the procedure was causing discomfort past their tolerance level. Patient agreed to advise acupuncturist. The acupuncturist counseled the patient on the risks of acupuncture treatment including pain, infection, bleeding, and no relief of pain. The patient was positioned comfortably. There was no evidence of infection at the site of needle insertions.    Objective   Physical Exam              Acupuncture Treatment  Patient Position: Seated and reclining  Acupuncture Needling:  Yes  Needle Guage: 40 guage /.16/ Red seirin  Body Points: With retention  Body Points - Bilateral: Du 20, yintang, R 17, FRED 1, KD 25, FRED 5, FRED 7, LI 4, SJ 5, ST 36  Auricular Points: No  Electroacupuncture Used: No  Other Techniques Utilized: Ear seeds, Cupping  Cupping Description: stationary cupping on chest and upper back  Ear Seeds: Stainless steel (shenmen)  Needle Count In: 17  Needle Count Out: 17  Needle Retention Time (min): 25 minutes  Total Face to Face Time (min): 25 minutes  Supplement(s) 1: Discontinue Marv [employer] Herbs - SVP Lung since starting new antibiotic.              Assessment/Plan   Diagnoses and all orders for this visit:  Neck pain  Nonintractable headache, unspecified chronicity pattern, unspecified headache type  Upper back pain  Upper respiratory tract infection, unspecified type

## 2024-11-30 NOTE — PROGRESS NOTES
Acupuncture Visit:     Subjective   Patient ID: Elizabeth Barnes is a 39 y.o. female who presents for Neck Pain, Back Pain, Pneumonia, and Headache    Pneumonia: she was diagnosed with pneumonia a few days ago.  She has been having fevers, coughing, congestion, fatigue, headache.  She is on antibiotics but still getting fevers particularly at night.     Neck pain and chronic right shoulder/upper back pain: increased neck and upper back pain with coughing.     (L) knee pain: no issues with her knee this week. She has been resting and not working out with URI.    Headache: headaches with URI    Palpitations:  not noticing palpitations this week.        Session Information  Is this acupuncture treatment being billed to the patient's insurance company: Yes  Last Treatment date: 11/08/24  Name of Insurance Company: [employer]  Visit Type: Follow-up visit  Medical History Reviewed: I have reviewed pertinent medical history in EHR, and no contraindications are present to provide treatment         Review of Systems  Sleep: poor due to coughing    Digestion: poor appetite with URI    Gyne:   10/7/24 LMP 10/31/24          Provider reviewed plan for the acupuncture session, precautions and contraindications. Patient/guardian/hospital staff has given consent to treat with full understanding of what to expect during the session. Before acupuncture began, provider explained to the patient to communicate at any time if the procedure was causing discomfort past their tolerance level. Patient agreed to advise acupuncturist. The acupuncturist counseled the patient on the risks of acupuncture treatment including pain, infection, bleeding, and no relief of pain. The patient was positioned comfortably. There was no evidence of infection at the site of needle insertions.    Objective   Physical Exam              Acupuncture Treatment  Patient Position: Seated and reclining  Acupuncture Needling: Yes  Needle Guage: 40 guage /.16/ Red  erasto  Body Points: With retention  Body Points - Bilateral: Du 20, yintang, R 17, FRED 1, FRED 5, FRED 7, LI 4, SJ 5, KD 25  Auricular Points: No  Electroacupuncture Used: No  Other Techniques Utilized: Ear seeds  Ear Seeds: Stainless steel (shenmen)  Needle Count In: 15  Needle Count Out: 15  Needle Retention Time (min): 25 minutes  Total Face to Face Time (min): 25 minutes  Supplement(s) 1: Marv [employer] Herbs - SVP Lung 5 squirts 3-4 times per day.              Assessment/Plan   Diagnoses and all orders for this visit:  Neck pain  Nonintractable headache, unspecified chronicity pattern, unspecified headache type  Other fatigue  Upper respiratory tract infection, unspecified type

## 2024-12-01 DIAGNOSIS — E03.9 HYPOTHYROIDISM, UNSPECIFIED TYPE: ICD-10-CM

## 2024-12-03 ENCOUNTER — PATIENT MESSAGE (OUTPATIENT)
Dept: ENDOCRINOLOGY | Facility: CLINIC | Age: 39
End: 2024-12-03
Payer: COMMERCIAL

## 2024-12-03 DIAGNOSIS — E03.9 HYPOTHYROIDISM, UNSPECIFIED TYPE: Primary | ICD-10-CM

## 2024-12-03 RX ORDER — LEVOTHYROXINE SODIUM 50 UG/1
TABLET ORAL
Qty: 105 TABLET | Refills: 3 | OUTPATIENT
Start: 2024-12-03

## 2024-12-06 ENCOUNTER — APPOINTMENT (OUTPATIENT)
Dept: INTEGRATIVE MEDICINE | Facility: CLINIC | Age: 39
End: 2024-12-06
Payer: COMMERCIAL

## 2024-12-06 DIAGNOSIS — E03.9 HYPOTHYROIDISM, UNSPECIFIED TYPE: ICD-10-CM

## 2024-12-06 RX ORDER — LEVOTHYROXINE SODIUM 50 UG/1
TABLET ORAL
Qty: 90 TABLET | Refills: 0 | Status: SHIPPED | OUTPATIENT
Start: 2024-12-06

## 2024-12-10 ENCOUNTER — APPOINTMENT (OUTPATIENT)
Dept: INTEGRATIVE MEDICINE | Facility: CLINIC | Age: 39
End: 2024-12-10
Payer: COMMERCIAL

## 2024-12-13 ENCOUNTER — ALLIED HEALTH (OUTPATIENT)
Dept: INTEGRATIVE MEDICINE | Facility: CLINIC | Age: 39
End: 2024-12-13
Payer: COMMERCIAL

## 2024-12-13 DIAGNOSIS — M54.2 NECK PAIN: Primary | ICD-10-CM

## 2024-12-13 DIAGNOSIS — R51.9 NONINTRACTABLE HEADACHE, UNSPECIFIED CHRONICITY PATTERN, UNSPECIFIED HEADACHE TYPE: ICD-10-CM

## 2024-12-13 DIAGNOSIS — M54.9 UPPER BACK PAIN: ICD-10-CM

## 2024-12-13 DIAGNOSIS — J06.9 UPPER RESPIRATORY TRACT INFECTION, UNSPECIFIED TYPE: ICD-10-CM

## 2024-12-13 PROCEDURE — 97810 ACUP 1/> WO ESTIM 1ST 15 MIN: CPT | Performed by: ACUPUNCTURIST

## 2024-12-13 PROCEDURE — 97811 ACUP 1/> W/O ESTIM EA ADD 15: CPT | Performed by: ACUPUNCTURIST

## 2024-12-13 NOTE — PROGRESS NOTES
Acupuncture Visit:     Subjective   Patient ID: Elizabeth Barnes is a 40 y.o. female who presents for Neck Pain, Back Pain, Headache, and Pneumonia    URI: feels like she is fully recovered from the pneumonia but got another illness this past week - she is having sinus congestion and pain and post nasal drip.  She is also fatigued from lack of sleep.  Headaches with her sinuses.     Neck pain and chronic right shoulder/upper back pain:  less pain in the neck and upper back but some headaches.     (L) knee pain: doing well with resting more from being sick.     Headache: headaches this week particularly in the sinuses.    Palpitations: not noticing palpitations this week, except for maybe some after a big meal.         Session Information  Is this acupuncture treatment being billed to the patient's insurance company: Yes  Last Treatment date: 11/22/24  Name of Insurance Company: [employer]  Visit Type: Follow-up visit  Medical History Reviewed: I have reviewed pertinent medical history in EHR, and no contraindications are present to provide treatment         Review of Systems  Sleep: not sleeping well this week due to illnesses in her house    Digestion: doing well.    Gyne:  LMP  Today is CD 21.     Stress: moderate       Provider reviewed plan for the acupuncture session, precautions and contraindications. Patient/guardian/hospital staff has given consent to treat with full understanding of what to expect during the session. Before acupuncture began, provider explained to the patient to communicate at any time if the procedure was causing discomfort past their tolerance level. Patient agreed to advise acupuncturist. The acupuncturist counseled the patient on the risks of acupuncture treatment including pain, infection, bleeding, and no relief of pain. The patient was positioned comfortably. There was no evidence of infection at the site of needle insertions.    Objective   Physical Exam              Acupuncture  Treatment  Patient Position: Supine on a table  Acupuncture Needling: Yes  Needle Guage: 40 guage /.16/ Red seirin  Body Points: With retention  Body Points - Bilateral: Du 20, yintang, LI 20, LI 4, FRED 1, R 17, R 12, ST 25, R 6, ST 36  Auricular Points: No  Electroacupuncture Used: No  Other Techniques Utilized: Ear seeds, TDP Lamp  Ear Seeds: Stainless steel (shenmen)  TDP Lamp Descripton: feet and abdomen  Needle Count In: 15  Needle Count Out: 15  Needle Retention Time (min): 25 minutes  Total Face to Face Time (min): 25 minutes              Assessment/Plan   Diagnoses and all orders for this visit:  Neck pain  Upper back pain  Nonintractable headache, unspecified chronicity pattern, unspecified headache type  Upper respiratory tract infection, unspecified type

## 2024-12-19 ENCOUNTER — APPOINTMENT (OUTPATIENT)
Dept: INTEGRATIVE MEDICINE | Facility: CLINIC | Age: 39
End: 2024-12-19
Payer: COMMERCIAL

## 2024-12-19 DIAGNOSIS — J06.9 UPPER RESPIRATORY TRACT INFECTION, UNSPECIFIED TYPE: ICD-10-CM

## 2024-12-19 DIAGNOSIS — M54.9 UPPER BACK PAIN: ICD-10-CM

## 2024-12-19 DIAGNOSIS — M54.2 NECK PAIN: Primary | ICD-10-CM

## 2024-12-19 DIAGNOSIS — R51.9 NONINTRACTABLE HEADACHE, UNSPECIFIED CHRONICITY PATTERN, UNSPECIFIED HEADACHE TYPE: ICD-10-CM

## 2024-12-19 PROCEDURE — 97811 ACUP 1/> W/O ESTIM EA ADD 15: CPT | Performed by: ACUPUNCTURIST

## 2024-12-19 PROCEDURE — 97810 ACUP 1/> WO ESTIM 1ST 15 MIN: CPT | Performed by: ACUPUNCTURIST

## 2024-12-19 NOTE — PROGRESS NOTES
Acupuncture Visit:     Subjective   Patient ID: Elizabeth Barnes is a 40 y.o. female who presents for Neck Pain, Back Pain, Headache, and URI    URI: she went to the doctor yesterday for her post-pneumonia visit and her lungs sound good but she wants her to do another chest xray in February. She has been having a lot of RSV drainage from her sinuses - she is having pain in the maxillary sinuses.  She is starting to feel like she wants to exercise.     Neck pain and chronic right shoulder/upper back pain:  some pain and tension in the neck and upper back this week with headaches.    (L) knee pain: doing well with resting more from being sick.     Headache: sinus headaches this week.     Palpitations: no palpitations.         Session Information  Is this acupuncture treatment being billed to the patient's insurance company: Yes  Last Treatment date: 12/13/24  Name of Insurance Company: [employer]  Visit Type: Follow-up visit  Medical History Reviewed: I have reviewed pertinent medical history in EHR, and no contraindications are present to provide treatment         Review of Systems  Sleep: she is sleeping really well. She is trying to go to sleep earlier.     Digestion: doing well.    Gyne:  LMP 12/19/24  She had a 26 day cycle.  She is having cramping with the start of her period today.     Stress: moderate       Provider reviewed plan for the acupuncture session, precautions and contraindications. Patient/guardian/hospital staff has given consent to treat with full understanding of what to expect during the session. Before acupuncture began, provider explained to the patient to communicate at any time if the procedure was causing discomfort past their tolerance level. Patient agreed to advise acupuncturist. The acupuncturist counseled the patient on the risks of acupuncture treatment including pain, infection, bleeding, and no relief of pain. The patient was positioned comfortably. There was no evidence of infection  at the site of needle insertions.    Objective   Physical Exam              Acupuncture Treatment  Patient Position: Supine on a table  Acupuncture Needling: Yes  Needle Guage: 40 guage /.16/ Red seirin  Body Points: With retention  Body Points - Bilateral: Du 20, yintang, LI 20, LI 4, R 17, R 12, ST 25, R 6, ST 36, GB 21  Auricular Points: No  Electroacupuncture Used: No  Other Techniques Utilized: Ear seeds, TDP Lamp, Cupping  Cupping Description: running cupping on upper back with massage oil  Ear Seeds: Stainless steel (shenmen)  TDP Lamp Descripton: feet and abdomen  Needle Count In: 15  Needle Count Out: 15  Needle Retention Time (min): 25 minutes  Total Face to Face Time (min): 25 minutes              Assessment/Plan   Diagnoses and all orders for this visit:  Neck pain  Upper back pain  Nonintractable headache, unspecified chronicity pattern, unspecified headache type  Upper respiratory tract infection, unspecified type

## 2024-12-24 ENCOUNTER — APPOINTMENT (OUTPATIENT)
Dept: INTEGRATIVE MEDICINE | Facility: CLINIC | Age: 39
End: 2024-12-24
Payer: COMMERCIAL

## 2024-12-24 DIAGNOSIS — M54.9 UPPER BACK PAIN: ICD-10-CM

## 2024-12-24 DIAGNOSIS — M54.2 NECK PAIN: Primary | ICD-10-CM

## 2024-12-24 DIAGNOSIS — R51.9 NONINTRACTABLE HEADACHE, UNSPECIFIED CHRONICITY PATTERN, UNSPECIFIED HEADACHE TYPE: ICD-10-CM

## 2024-12-24 PROCEDURE — 97811 ACUP 1/> W/O ESTIM EA ADD 15: CPT | Performed by: ACUPUNCTURIST

## 2024-12-24 PROCEDURE — 97810 ACUP 1/> WO ESTIM 1ST 15 MIN: CPT | Performed by: ACUPUNCTURIST

## 2024-12-24 NOTE — PROGRESS NOTES
Acupuncture Visit:     Subjective   Patient ID: Elizabeth Barnes is a 40 y.o. female who presents for Neck Pain, Back Pain, and Headache    She is feeling much better overall. Having a better week with less pain and more energy.    Pneumonia: her lungs and sinuses are feeling good.  She will be checked again in Feb with an xray.    Neck pain and chronic right shoulder/upper back pain:  good. Less pain and less tightness this week.     (L) knee pain: doing well and starting to exercise again.    Headache: no headaches.    Palpitations: no palpitations.         Session Information  Is this acupuncture treatment being billed to the patient's insurance company: Yes  Last Treatment date: 12/19/24  Name of Insurance Company: [employer]  Visit Type: Follow-up visit  Medical History Reviewed: I have reviewed pertinent medical history in EHR, and no contraindications are present to provide treatment         Review of Systems  Sleep: she is sleeping really well. She is trying to go to sleep earlier.     Digestion: doing well.    Gyne:  LMP 12/19/24     Stress: anxiety has been high with kids being sick       Provider reviewed plan for the acupuncture session, precautions and contraindications. Patient/guardian/hospital staff has given consent to treat with full understanding of what to expect during the session. Before acupuncture began, provider explained to the patient to communicate at any time if the procedure was causing discomfort past their tolerance level. Patient agreed to advise acupuncturist. The acupuncturist counseled the patient on the risks of acupuncture treatment including pain, infection, bleeding, and no relief of pain. The patient was positioned comfortably. There was no evidence of infection at the site of needle insertions.    Objective   Physical Exam              Acupuncture Treatment  Patient Position: Supine on a table  Acupuncture Needling: Yes  Needle Guage: 40 guage /.16/ Red seirin  Body Points:  With retention  Body Points - Bilateral: DU 20, yintang, FRED 1, R 17, R 12, ST 25, Sp 15, R 6, P 6, ST 36, SP 6  Auricular Points: No  Electroacupuncture Used: No  Other Techniques Utilized: Ear seeds, TDP Lamp  Ear Seeds: Stainless steel (shenmen)  TDP Lamp Descripton: feet and abdomen  Needle Count In: 17  Needle Count Out: 17  Needle Retention Time (min): 25 minutes  Total Face to Face Time (min): 25 minutes              Assessment/Plan   Diagnoses and all orders for this visit:  Neck pain  Upper back pain  Nonintractable headache, unspecified chronicity pattern, unspecified headache type

## 2024-12-31 ENCOUNTER — APPOINTMENT (OUTPATIENT)
Dept: INTEGRATIVE MEDICINE | Facility: CLINIC | Age: 39
End: 2024-12-31
Payer: COMMERCIAL

## 2025-01-07 ENCOUNTER — APPOINTMENT (OUTPATIENT)
Dept: INTEGRATIVE MEDICINE | Facility: CLINIC | Age: 40
End: 2025-01-07
Payer: COMMERCIAL

## 2025-01-10 ENCOUNTER — ALLIED HEALTH (OUTPATIENT)
Dept: INTEGRATIVE MEDICINE | Facility: CLINIC | Age: 40
End: 2025-01-10
Payer: COMMERCIAL

## 2025-01-10 ENCOUNTER — APPOINTMENT (OUTPATIENT)
Dept: INTEGRATIVE MEDICINE | Facility: CLINIC | Age: 40
End: 2025-01-10
Payer: COMMERCIAL

## 2025-01-10 DIAGNOSIS — M54.9 UPPER BACK PAIN: ICD-10-CM

## 2025-01-10 DIAGNOSIS — M54.2 NECK PAIN: Primary | ICD-10-CM

## 2025-01-10 DIAGNOSIS — R51.9 NONINTRACTABLE HEADACHE, UNSPECIFIED CHRONICITY PATTERN, UNSPECIFIED HEADACHE TYPE: ICD-10-CM

## 2025-01-10 PROCEDURE — 97811 ACUP 1/> W/O ESTIM EA ADD 15: CPT | Performed by: ACUPUNCTURIST

## 2025-01-10 PROCEDURE — 97810 ACUP 1/> WO ESTIM 1ST 15 MIN: CPT | Performed by: ACUPUNCTURIST

## 2025-01-10 NOTE — PROGRESS NOTES
Acupuncture Visit:     Subjective   Patient ID: Elizabeth Barnes is a 40 y.o. female who presents for Neck Pain, Back Pain, and Headache    Neck pain and chronic right shoulder/upper back pain:  overall doing well. Mild tension and soreness partcularly on the left upper back.  She would like cupping today.     (L) knee pain: no issues.     Headache: no headaches    Palpitations: feeling more palpitations from stress and anxiety this week.     URI: she is getting a chest xray next week to follow up from her pneumonia.         Session Information  Is this acupuncture treatment being billed to the patient's insurance company: Yes  Last Treatment date: 12/24/24  Name of Insurance Company: [place]  Visit Type: Follow-up visit  Medical History Reviewed: I have reviewed pertinent medical history in EHR, and no contraindications are present to provide treatment         Review of Systems  Sleep: her kids have been sick for several weeks so sleep was disturbed.  Harder to sleep this week with work and stress    Digestion: more constipation since taking a break from acupuncture    Gyne:  LMP 12/19/24  She is anticipating her period soon.     Stress: higher stress and anxiety.        Provider reviewed plan for the acupuncture session, precautions and contraindications. Patient/guardian/hospital staff has given consent to treat with full understanding of what to expect during the session. Before acupuncture began, provider explained to the patient to communicate at any time if the procedure was causing discomfort past their tolerance level. Patient agreed to advise acupuncturist. The acupuncturist counseled the patient on the risks of acupuncture treatment including pain, infection, bleeding, and no relief of pain. The patient was positioned comfortably. There was no evidence of infection at the site of needle insertions.    Objective   Physical Exam              Acupuncture Treatment  Patient Position: Supine on a  table  Acupuncture Needling: Yes  Needle Guage: 40 guage /.16/ Red seirin  Body Points: With retention  Body Points - Bilateral: Du 20, yintang, R 17, FRED 1, R 12, SP 15, R 6, P 6, HT 7, ST 36, SP 6  Auricular Points: No  Electroacupuncture Used: No  Other Techniques Utilized: Ear seeds, TDP Lamp, Cupping  Cupping Description: running cupping on upper back with massage oil  Ear Seeds: Stainless steel (shenmen)  TDP Lamp Descripton: feet and abdomen  Needle Count In: 17  Needle Count Out: 17  Needle Retention Time (min): 25 minutes  Total Face to Face Time (min): 25 minutes              Assessment/Plan   Diagnoses and all orders for this visit:  Neck pain  Upper back pain  Nonintractable headache, unspecified chronicity pattern, unspecified headache type

## 2025-01-14 ENCOUNTER — LAB (OUTPATIENT)
Dept: LAB | Facility: LAB | Age: 40
End: 2025-01-14
Payer: COMMERCIAL

## 2025-01-14 DIAGNOSIS — E03.9 HYPOTHYROIDISM, UNSPECIFIED TYPE: ICD-10-CM

## 2025-01-14 LAB
T4 FREE SERPL-MCNC: 1.16 NG/DL (ref 0.78–1.48)
TSH SERPL-ACNC: 4.1 MIU/L (ref 0.44–3.98)

## 2025-01-14 PROCEDURE — 84443 ASSAY THYROID STIM HORMONE: CPT

## 2025-01-14 PROCEDURE — 84439 ASSAY OF FREE THYROXINE: CPT

## 2025-01-17 ENCOUNTER — APPOINTMENT (OUTPATIENT)
Dept: INTEGRATIVE MEDICINE | Facility: CLINIC | Age: 40
End: 2025-01-17
Payer: COMMERCIAL

## 2025-01-17 DIAGNOSIS — M54.2 NECK PAIN: Primary | ICD-10-CM

## 2025-01-17 DIAGNOSIS — R51.9 NONINTRACTABLE HEADACHE, UNSPECIFIED CHRONICITY PATTERN, UNSPECIFIED HEADACHE TYPE: ICD-10-CM

## 2025-01-17 DIAGNOSIS — M54.9 UPPER BACK PAIN: ICD-10-CM

## 2025-01-17 PROCEDURE — 97810 ACUP 1/> WO ESTIM 1ST 15 MIN: CPT | Performed by: ACUPUNCTURIST

## 2025-01-17 PROCEDURE — 97811 ACUP 1/> W/O ESTIM EA ADD 15: CPT | Performed by: ACUPUNCTURIST

## 2025-01-17 NOTE — PROGRESS NOTES
Acupuncture Visit:     Subjective   Patient ID: Elizabeth Barnes is a 39 y.o. female who presents for Headache and Neck Pain    Neck pain and chronic right shoulder/upper back pain:  tightness in the neck and upper back - might be contributing to her headaches.  Stress has been moderate.    (L) knee pain: no issues.     Headache: she has been getting a lot of headaches.  Headaches are happening daily - pain is above her eyes.  She is not sure of the cause.     Palpitations: heart palpitations worse before her period but better since her period came.     Pneumonia: she had an xray and there is some residual fullness in the lung and they recommended a CT scan.  Breathing has been good and her exercise is going well - so breathing feels back to normal.  Still occasional cough. She will talk with her doctor more about the xray results.         Session Information  Is this acupuncture treatment being billed to the patient's insurance company: Yes  Last Treatment date: 01/10/25  Name of Insurance Company: [employer]  Visit Type: Follow-up visit  Medical History Reviewed: I have reviewed pertinent medical history in EHR, and no contraindications are present to provide treatment         Review of Systems  Sleep: much better since her kids are now healthy.    Digestion: some constipation    Gyne:   12/19/24 LMP 1/12/25 Today is CD 6.  Palpitations worse in the luteal phase again.      Stress: moderate       Provider reviewed plan for the acupuncture session, precautions and contraindications. Patient/guardian/hospital staff has given consent to treat with full understanding of what to expect during the session. Before acupuncture began, provider explained to the patient to communicate at any time if the procedure was causing discomfort past their tolerance level. Patient agreed to advise acupuncturist. The acupuncturist counseled the patient on the risks of acupuncture treatment including pain, infection, bleeding, and no  relief of pain. The patient was positioned comfortably. There was no evidence of infection at the site of needle insertions.    Objective   Physical Exam              Acupuncture Treatment  Patient Position: Supine on a table  Acupuncture Needling: Yes  Needle Guage: 40 guage /.16/ Red seirin  Body Points: With retention  Body Points - Bilateral: Du 20, yintang, R 17, R 12, ST 25, SP 15, R 6, P 6, ST 36, FRED 7  Body Points - Right: FRED 1  Auricular Points: No  Electroacupuncture Used: No  Other Techniques Utilized: Ear seeds, TDP Lamp  Ear Seeds: Stainless steel (shenmen)  TDP Lamp Descripton: feet and abdomen  Needle Count In: 16  Needle Count Out: 16  Needle Retention Time (min): 25 minutes  Total Face to Face Time (min): 25 minutes              Assessment/Plan   Diagnoses and all orders for this visit:  Neck pain  Nonintractable headache, unspecified chronicity pattern, unspecified headache type  Upper back pain

## 2025-01-24 ENCOUNTER — APPOINTMENT (OUTPATIENT)
Dept: INTEGRATIVE MEDICINE | Facility: CLINIC | Age: 40
End: 2025-01-24
Payer: COMMERCIAL

## 2025-01-24 DIAGNOSIS — R51.9 NONINTRACTABLE HEADACHE, UNSPECIFIED CHRONICITY PATTERN, UNSPECIFIED HEADACHE TYPE: Primary | ICD-10-CM

## 2025-01-24 DIAGNOSIS — N94.6 DYSMENORRHEA: ICD-10-CM

## 2025-01-24 DIAGNOSIS — M54.2 NECK PAIN: ICD-10-CM

## 2025-01-24 PROCEDURE — 97811 ACUP 1/> W/O ESTIM EA ADD 15: CPT | Performed by: ACUPUNCTURIST

## 2025-01-24 PROCEDURE — 97810 ACUP 1/> WO ESTIM 1ST 15 MIN: CPT | Performed by: ACUPUNCTURIST

## 2025-01-24 NOTE — PROGRESS NOTES
Acupuncture Visit:     Subjective   Patient ID: Elizabeth Barnes is a 39 y.o. female who presents for Headache and Neck Pain    Neck pain and chronic right shoulder/upper back pain: doing well this week.  Pain and tightness are improving.    (L) knee pain: very good now.  No more clicking in the left knee.     Headache: still getting headaches - above her eyes and sinuses. Frequent throughout the week.    Palpitations: no issues with heart palpitations this week.    Pneumonia: planning for another xray in March.  Fullness in the right upper lobe seen on xray.  No problems with pain or shortness of breath.          Session Information  Is this acupuncture treatment being billed to the patient's insurance company: Yes  Last Treatment date: 01/17/25  Name of Insurance Company: [employer]  Visit Type: Follow-up visit  Medical History Reviewed: I have reviewed pertinent medical history in EHR, and no contraindications are present to provide treatment         Review of Systems  Sleep: doing well.    Digestion: doing well now.  Acupuncture helped constipation.    Gyne:   12/19/24 LMP 1/12/25 Today is CD 13. Ovulated last night and did have pain but managed with Advil.     Stress: moderate to high       Provider reviewed plan for the acupuncture session, precautions and contraindications. Patient/guardian/hospital staff has given consent to treat with full understanding of what to expect during the session. Before acupuncture began, provider explained to the patient to communicate at any time if the procedure was causing discomfort past their tolerance level. Patient agreed to advise acupuncturist. The acupuncturist counseled the patient on the risks of acupuncture treatment including pain, infection, bleeding, and no relief of pain. The patient was positioned comfortably. There was no evidence of infection at the site of needle insertions.    Objective   Physical Exam              Acupuncture Treatment  Patient Position:  Supine on a table  Acupuncture Needling: Yes  Needle Guage: 40 guage /.16/ Red seirin  Body Points: With retention  Body Points - Bilateral: Du 20, yintang, R 14, R 12, SP 15, R 6, P 6, ST 36  Body Points - Right: FRED 1, KD 24  Auricular Points: No  Electroacupuncture Used: No  Other Techniques Utilized: Ear seeds, TDP Lamp  Ear Seeds: Stainless steel (shenmen)  TDP Lamp Descripton: feet and abdomen  Needle Count In: 13  Needle Count Out: 13  Needle Retention Time (min): 25 minutes  Total Face to Face Time (min): 25 minutes              Assessment/Plan   Diagnoses and all orders for this visit:  Nonintractable headache, unspecified chronicity pattern, unspecified headache type  Neck pain  Dysmenorrhea

## 2025-01-31 ENCOUNTER — APPOINTMENT (OUTPATIENT)
Dept: INTEGRATIVE MEDICINE | Facility: CLINIC | Age: 40
End: 2025-01-31
Payer: COMMERCIAL

## 2025-01-31 DIAGNOSIS — M54.2 NECK PAIN: ICD-10-CM

## 2025-01-31 DIAGNOSIS — M54.9 UPPER BACK PAIN: ICD-10-CM

## 2025-01-31 DIAGNOSIS — R51.9 NONINTRACTABLE HEADACHE, UNSPECIFIED CHRONICITY PATTERN, UNSPECIFIED HEADACHE TYPE: Primary | ICD-10-CM

## 2025-01-31 DIAGNOSIS — N94.6 DYSMENORRHEA: ICD-10-CM

## 2025-01-31 PROCEDURE — 97811 ACUP 1/> W/O ESTIM EA ADD 15: CPT | Performed by: ACUPUNCTURIST

## 2025-01-31 PROCEDURE — 97810 ACUP 1/> WO ESTIM 1ST 15 MIN: CPT | Performed by: ACUPUNCTURIST

## 2025-01-31 NOTE — PROGRESS NOTES
Acupuncture Visit:     Subjective   Patient ID: Elizabeth Barnes is a 40 y.o. female who presents for Neck Pain, Back Pain, Headache, and Dysmenorrhea    Neck pain and chronic right shoulder/upper back pain: she was traveling this week and sleeping in a different bed and stress has been high, but she is feeling like her body pain is under control.  Some tightness in the neck and back but no pain this week.    Headache: stress was very high this week but her headaches are under control.  Feels better with regular acupuncture.     Palpitations: no issues with heart palpitations this week.    Pneumonia: planning for another xray in March.  Fullness in the right upper lobe seen on xray.  No problems with pain or shortness of breath.          Session Information  Is this acupuncture treatment being billed to the patient's insurance company: Yes  Last Treatment date: 01/24/25  Name of Insurance Company: [employer]  Visit Type: Follow-up visit  Medical History Reviewed: I have reviewed pertinent medical history in EHR, and no contraindications are present to provide treatment         Review of Systems  Sleep: some restlessness in sleep from travel.    Digestion: a little constipation     Gyne:   12/19/24 LMP 1/12/25 Today is CD 20  She had cramping again on the left abdomen this week even though she already ovulated.      Stress: high stress       Provider reviewed plan for the acupuncture session, precautions and contraindications. Patient/guardian/hospital staff has given consent to treat with full understanding of what to expect during the session. Before acupuncture began, provider explained to the patient to communicate at any time if the procedure was causing discomfort past their tolerance level. Patient agreed to advise acupuncturist. The acupuncturist counseled the patient on the risks of acupuncture treatment including pain, infection, bleeding, and no relief of pain. The patient was positioned comfortably. There  was no evidence of infection at the site of needle insertions.    Objective   Physical Exam              Acupuncture Treatment  Patient Position: Supine on a table  Acupuncture Needling: Yes  Needle Guage: 40 guage /.16/ Red seirin  Body Points: With retention  Body Points - Bilateral: DU 20, yintang, R 17, P 6, LI 11, R 12, ST 25, R 6, zigong, ST 36  Auricular Points: No  Electroacupuncture Used: No  Other Techniques Utilized: Ear seeds, TDP Lamp  Ear Seeds: Stainless steel (shenmen)  TDP Lamp Descripton: feet and abdomen  Needle Count In: 15  Needle Count Out: 15  Needle Retention Time (min): 25 minutes  Total Face to Face Time (min): 25 minutes              Assessment/Plan   Diagnoses and all orders for this visit:  Nonintractable headache, unspecified chronicity pattern, unspecified headache type  Neck pain  Upper back pain  Dysmenorrhea

## 2025-02-07 ENCOUNTER — APPOINTMENT (OUTPATIENT)
Dept: INTEGRATIVE MEDICINE | Facility: CLINIC | Age: 40
End: 2025-02-07
Payer: COMMERCIAL

## 2025-02-07 DIAGNOSIS — M54.2 NECK PAIN: Primary | ICD-10-CM

## 2025-02-07 DIAGNOSIS — M54.9 UPPER BACK PAIN: ICD-10-CM

## 2025-02-07 DIAGNOSIS — N94.6 DYSMENORRHEA: ICD-10-CM

## 2025-02-07 PROCEDURE — 97810 ACUP 1/> WO ESTIM 1ST 15 MIN: CPT | Performed by: ACUPUNCTURIST

## 2025-02-07 PROCEDURE — 97811 ACUP 1/> W/O ESTIM EA ADD 15: CPT | Performed by: ACUPUNCTURIST

## 2025-02-07 NOTE — PROGRESS NOTES
Acupuncture Visit:     Subjective   Patient ID: Elizabeth Barnes is a 40 y.o. female who presents for Neck Pain, Back Pain, and Stress    Neck pain and chronic right shoulder/upper back pain: tension and pain from stress this week.  She needed Advil this week to help with pain and tension in the neck and upper back.  Holding her shoulders up a lot.    Headache: no headaches even with a lot of stress this week.     Palpitations: she had a high heart rate this week but no palpitations.  Hard to calm down.     Pneumonia: planning for another xray in March.  Fullness in the right upper lobe seen on xray.  No problems with pain or shortness of breath.          Session Information  Is this acupuncture treatment being billed to the patient's insurance company: Yes  Last Treatment date: 01/31/25  Name of Insurance Company: [employer]  Visit Type: Follow-up visit  Medical History Reviewed: I have reviewed pertinent medical history in EHR, and no contraindications are present to provide treatment         Review of Systems  Sleep:  doing okay this week.     Digestion: loose stool with anxiety and stress this week. Poor appetite.     Gyne:   12/19/24 LMP 1/12/25 LMP 2/6/25  She had a lot of PMS this week and hard time regulating anxiety with PMS. Feeling better now that period started.     Stress: high stress this week       Provider reviewed plan for the acupuncture session, precautions and contraindications. Patient/guardian/hospital staff has given consent to treat with full understanding of what to expect during the session. Before acupuncture began, provider explained to the patient to communicate at any time if the procedure was causing discomfort past their tolerance level. Patient agreed to advise acupuncturist. The acupuncturist counseled the patient on the risks of acupuncture treatment including pain, infection, bleeding, and no relief of pain. The patient was positioned comfortably. There was no evidence of  infection at the site of needle insertions.    Objective   Physical Exam              Acupuncture Treatment  Patient Position: Supine on a table  Acupuncture Needling: Yes  Needle Guage: 36 guage /.20/ Blue seirin  Body Points: With retention  Body Points - Bilateral: Du 20, yintang, R 17, R 12, ST 25, R 6, P 6, HT 7, ST 36, DANIAL 3  Auricular Points: No  Electroacupuncture Used: No  Other Techniques Utilized: Ear seeds, TDP Lamp, Cupping  Cupping Description: running cupping on upper back with massage oil  Ear Seeds: Stainless steel (shenmen)  TDP Lamp Descripton: feet and abdomen  Needle Count In: 15  Needle Count Out: 15  Needle Retention Time (min): 25 minutes  Total Face to Face Time (min): 25 minutes              Assessment/Plan   Diagnoses and all orders for this visit:  Neck pain  Upper back pain  Dysmenorrhea

## 2025-02-14 ENCOUNTER — APPOINTMENT (OUTPATIENT)
Dept: INTEGRATIVE MEDICINE | Facility: CLINIC | Age: 40
End: 2025-02-14
Payer: COMMERCIAL

## 2025-02-14 DIAGNOSIS — M54.2 NECK PAIN: Primary | ICD-10-CM

## 2025-02-14 DIAGNOSIS — R51.9 NONINTRACTABLE HEADACHE, UNSPECIFIED CHRONICITY PATTERN, UNSPECIFIED HEADACHE TYPE: ICD-10-CM

## 2025-02-14 DIAGNOSIS — N94.6 DYSMENORRHEA: ICD-10-CM

## 2025-02-14 DIAGNOSIS — M54.9 UPPER BACK PAIN: ICD-10-CM

## 2025-02-14 PROCEDURE — 97811 ACUP 1/> W/O ESTIM EA ADD 15: CPT | Performed by: ACUPUNCTURIST

## 2025-02-14 PROCEDURE — 97810 ACUP 1/> WO ESTIM 1ST 15 MIN: CPT | Performed by: ACUPUNCTURIST
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
## 2025-02-21 ENCOUNTER — APPOINTMENT (OUTPATIENT)
Dept: INTEGRATIVE MEDICINE | Facility: CLINIC | Age: 40
End: 2025-02-21
Payer: COMMERCIAL

## 2025-02-21 DIAGNOSIS — M54.2 NECK PAIN: Primary | ICD-10-CM

## 2025-02-21 DIAGNOSIS — N94.6 DYSMENORRHEA: ICD-10-CM

## 2025-02-21 DIAGNOSIS — R51.9 NONINTRACTABLE HEADACHE, UNSPECIFIED CHRONICITY PATTERN, UNSPECIFIED HEADACHE TYPE: ICD-10-CM

## 2025-02-21 DIAGNOSIS — M54.9 UPPER BACK PAIN: ICD-10-CM

## 2025-02-21 PROCEDURE — 97811 ACUP 1/> W/O ESTIM EA ADD 15: CPT | Performed by: ACUPUNCTURIST

## 2025-02-21 PROCEDURE — 97810 ACUP 1/> WO ESTIM 1ST 15 MIN: CPT | Performed by: ACUPUNCTURIST

## 2025-02-21 NOTE — PROGRESS NOTES
Acupuncture Visit:     Subjective   Patient ID: Elizabeth Barnes is a 40 y.o. female who presents for Neck Pain, Back Pain, Headache, and Dysmenorrhea    Neck pain and chronic right shoulder/upper back pain:  doing better this week.  Mild tension but no pain.    Headache: no headaches this week and stress has been lower too.  No travel this week.     Palpitations: palpitations a little worse just before ovulation and then improved.     Pneumonia: planning for another xray in March.  Fullness in the right upper lobe seen on xray.  No problems with pain or shortness of breath.          Session Information  Is this acupuncture treatment being billed to the patient's insurance company: Yes  Last Treatment date: 02/14/25  Name of Insurance Company: [employer]  Visit Type: Follow-up visit  Medical History Reviewed: I have reviewed pertinent medical history in EHR, and no contraindications are present to provide treatment         Review of Systems  Sleep: sleep has been good.     Digestion: no issues.     Gyne:   1/12/25 LMP 2/6/25 She ovulated 2 days ago.  She had a lot of pain but advil managed the pain.     Stress: stress lower this week.        Provider reviewed plan for the acupuncture session, precautions and contraindications. Patient/guardian/hospital staff has given consent to treat with full understanding of what to expect during the session. Before acupuncture began, provider explained to the patient to communicate at any time if the procedure was causing discomfort past their tolerance level. Patient agreed to advise acupuncturist. The acupuncturist counseled the patient on the risks of acupuncture treatment including pain, infection, bleeding, and no relief of pain. The patient was positioned comfortably. There was no evidence of infection at the site of needle insertions.    Objective   Physical Exam              Acupuncture Treatment  Patient Position: Supine on a table  Acupuncture Needling: Yes  Needle  Guage: 40 guage /.16/ Red seirin  Body Points: With retention  Body Points - Bilateral: Du 20, yintang, P 6, R 17, R 12, St 25, R 6, ST 36, GB 21  Auricular Points: No  Electroacupuncture Used: No  Other Techniques Utilized: Ear seeds, TDP Lamp  Ear Seeds: Stainless steel (shenmen)  TDP Lamp Descripton: feet and abdomen  Needle Count In: 13  Needle Count Out: 13  Needle Retention Time (min): 25 minutes  Total Face to Face Time (min): 25 minutes              Assessment/Plan   Diagnoses and all orders for this visit:  Neck pain  Upper back pain  Nonintractable headache, unspecified chronicity pattern, unspecified headache type  Dysmenorrhea

## 2025-02-28 ENCOUNTER — APPOINTMENT (OUTPATIENT)
Dept: INTEGRATIVE MEDICINE | Facility: CLINIC | Age: 40
End: 2025-02-28
Payer: COMMERCIAL

## 2025-02-28 ENCOUNTER — ALLIED HEALTH (OUTPATIENT)
Dept: INTEGRATIVE MEDICINE | Facility: CLINIC | Age: 40
End: 2025-02-28
Payer: COMMERCIAL

## 2025-02-28 DIAGNOSIS — R00.2 PALPITATIONS: ICD-10-CM

## 2025-02-28 DIAGNOSIS — J02.9 SORE THROAT: ICD-10-CM

## 2025-02-28 DIAGNOSIS — R51.9 NONINTRACTABLE HEADACHE, UNSPECIFIED CHRONICITY PATTERN, UNSPECIFIED HEADACHE TYPE: ICD-10-CM

## 2025-02-28 DIAGNOSIS — M54.2 NECK PAIN: Primary | ICD-10-CM

## 2025-02-28 DIAGNOSIS — M54.9 UPPER BACK PAIN: ICD-10-CM

## 2025-02-28 PROCEDURE — 97811 ACUP 1/> W/O ESTIM EA ADD 15: CPT | Performed by: ACUPUNCTURIST

## 2025-02-28 PROCEDURE — 97810 ACUP 1/> WO ESTIM 1ST 15 MIN: CPT | Performed by: ACUPUNCTURIST

## 2025-02-28 NOTE — PROGRESS NOTES
Acupuncture Visit:     Subjective   Patient ID: Elizabeth Barnes is a 40 y.o. female who presents for Headache, Neck Pain, Palpitations, and Sore Throat    Neck pain and chronic right shoulder/upper back pain:  some tightness in the neck and upper back but no pain this week.    Headache: no headaches this week.    Palpitations: a little bit of palpitations.  Anxiety has been higher with daughter being sick. And period is starting soon so palpitations are usually more prevalent in the luteal phase for her.    Pneumonia: planning for another xray in March.  Fullness in the right upper lobe seen on xray.  No problems with pain or shortness of breath.      URI: Sore throat, fatigue, no congestion, no cough, no fever. Daughter has been sick with a fever since Tuesday.         Session Information  Is this acupuncture treatment being billed to the patient's insurance company: Yes  Last Treatment date: 02/21/25  Name of Insurance Company: [employer]  Visit Type: Follow-up visit  Medical History Reviewed: I have reviewed pertinent medical history in EHR, and no contraindications are present to provide treatment         Review of Systems  Sleep: not sleeping well due to caring for sick daughter.    Digestion: no issues.     Gyne:   1/12/25 LMP 2/6/25 She is expecting her period soon.     Stress: high        Provider reviewed plan for the acupuncture session, precautions and contraindications. Patient/guardian/hospital staff has given consent to treat with full understanding of what to expect during the session. Before acupuncture began, provider explained to the patient to communicate at any time if the procedure was causing discomfort past their tolerance level. Patient agreed to advise acupuncturist. The acupuncturist counseled the patient on the risks of acupuncture treatment including pain, infection, bleeding, and no relief of pain. The patient was positioned comfortably. There was no evidence of infection at the site of  needle insertions.    Objective   Physical Exam              Acupuncture Treatment  Patient Position: Supine on a table  Acupuncture Needling: Yes  Needle Guage: 40 guage /.16/ Red seirin  Body Points: With retention  Body Points - Bilateral: yintang, LI 4, FRED 10, P 6, R 17, R 12, ST 25, R 6, zigong, ST 36  Auricular Points: No  Electroacupuncture Used: No  Other Techniques Utilized: Ear seeds, TDP Lamp  Ear Seeds: Stainless steel (shenmen)  TDP Lamp Descripton: feet and abdomen  Needle Count In: 16  Needle Count Out: 16  Needle Retention Time (min): 25 minutes  Total Face to Face Time (min): 25 minutes              Assessment/Plan   Diagnoses and all orders for this visit:  Neck pain  Upper back pain  Nonintractable headache, unspecified chronicity pattern, unspecified headache type  Palpitations  Sore throat

## 2025-03-14 ENCOUNTER — APPOINTMENT (OUTPATIENT)
Dept: INTEGRATIVE MEDICINE | Facility: CLINIC | Age: 40
End: 2025-03-14
Payer: COMMERCIAL

## 2025-03-14 DIAGNOSIS — M54.2 NECK PAIN: Primary | ICD-10-CM

## 2025-03-14 DIAGNOSIS — M54.9 UPPER BACK PAIN: ICD-10-CM

## 2025-03-14 DIAGNOSIS — R51.9 NONINTRACTABLE HEADACHE, UNSPECIFIED CHRONICITY PATTERN, UNSPECIFIED HEADACHE TYPE: ICD-10-CM

## 2025-03-14 PROCEDURE — 97810 ACUP 1/> WO ESTIM 1ST 15 MIN: CPT | Performed by: ACUPUNCTURIST

## 2025-03-14 PROCEDURE — 97811 ACUP 1/> W/O ESTIM EA ADD 15: CPT | Performed by: ACUPUNCTURIST

## 2025-03-14 NOTE — PROGRESS NOTES
Acupuncture Visit:     Subjective   Patient ID: Elizabeth Barnes is a 40 y.o. female who presents for Neck Pain, Back Pain, and Headache    Neck pain and chronic right shoulder/upper back pain:  she is getting a lot of neck and upper back pain.  She has a pinched muscle on the right upper back.  She is feeling pain in the top of the trap.  She had a massage this week and that was helpful.     Headache: she has been getting more headaches this week.     Palpitations: no palpitations    Pneumonia: she had her follow up xray and it was normal.        Session Information  Is this acupuncture treatment being billed to the patient's insurance company: Yes  Last Treatment date: 02/28/25  Name of Insurance Company: [employer]  Visit Type: Follow-up visit  Medical History Reviewed: I have reviewed pertinent medical history in EHR, and no contraindications are present to provide treatment         Review of Systems  Sleep: not sleeping well due to caring for sick daughter.    Digestion: no issues.     Gyne:   1/12/25 LMP 2/6/25 LMP 3/ Today is around CD 10. Feeling well right now.     Stress: high        Provider reviewed plan for the acupuncture session, precautions and contraindications. Patient/guardian/hospital staff has given consent to treat with full understanding of what to expect during the session. Before acupuncture began, provider explained to the patient to communicate at any time if the procedure was causing discomfort past their tolerance level. Patient agreed to advise acupuncturist. The acupuncturist counseled the patient on the risks of acupuncture treatment including pain, infection, bleeding, and no relief of pain. The patient was positioned comfortably. There was no evidence of infection at the site of needle insertions.    Objective   Physical Exam              Acupuncture Treatment  Patient Position: Supine on a table  Acupuncture Needling: Yes  Needle Guage: 40 guage /.16/ Red seirin  Body Points: With  retention  Body Points - Bilateral: Du 20, yintang, GB 21, P 6, R 12, ST 25, SP 15, R 6, ST 36, SP 6  Body Points - Right: GB 21 nikos  Auricular Points: No  Electroacupuncture Used: No  Other Techniques Utilized: Ear seeds, TDP Lamp  Ear Seeds: Stainless steel (shenmen)  TDP Lamp Descripton: feet and abdomen  Needle Count In: 17  Needle Count Out: 17  Needle Retention Time (min): 25 minutes  Total Face to Face Time (min): 25 minutes              Assessment/Plan   Diagnoses and all orders for this visit:  Neck pain  Upper back pain  Nonintractable headache, unspecified chronicity pattern, unspecified headache type

## 2025-03-21 ENCOUNTER — APPOINTMENT (OUTPATIENT)
Dept: INTEGRATIVE MEDICINE | Facility: CLINIC | Age: 40
End: 2025-03-21
Payer: COMMERCIAL

## 2025-03-21 DIAGNOSIS — R51.9 NONINTRACTABLE HEADACHE, UNSPECIFIED CHRONICITY PATTERN, UNSPECIFIED HEADACHE TYPE: ICD-10-CM

## 2025-03-21 DIAGNOSIS — M54.2 NECK PAIN: Primary | ICD-10-CM

## 2025-03-21 DIAGNOSIS — M54.9 UPPER BACK PAIN: ICD-10-CM

## 2025-03-21 PROCEDURE — 97810 ACUP 1/> WO ESTIM 1ST 15 MIN: CPT | Performed by: ACUPUNCTURIST

## 2025-03-21 PROCEDURE — 97811 ACUP 1/> W/O ESTIM EA ADD 15: CPT | Performed by: ACUPUNCTURIST

## 2025-03-21 NOTE — PROGRESS NOTES
Acupuncture Visit:     Subjective   Patient ID: Elizabeth Barnes is a 40 y.o. female who presents for Neck Pain and Headache    Neck pain and chronic right shoulder/upper back pain: neck has been improving this week.  The pinched muscle on her right neck and trap are less tight after acupuncture and massage last week.     Headache: no headaches.     Palpitations: no palpitations.    Supplements: elderberry gummy, multivitamin, magnesium, vitamin D  Medications: levothyroxine        Session Information  Is this acupuncture treatment being billed to the patient's insurance company: Yes  Last Treatment date: 03/14/25  Name of Insurance Company: [employer]  Visit Type: Follow-up visit  Medical History Reviewed: I have reviewed pertinent medical history in EHR, and no contraindications are present to provide treatment         Review of Systems  Sleep: not sleeping well this week.     Digestion: some constipation issues.     Gyne:  She thinks that she ovulated about 3 days ago and still having lingering ovarian pain. Worse on the left side.     Stress: mild to moderate        Provider reviewed plan for the acupuncture session, precautions and contraindications. Patient/guardian/hospital staff has given consent to treat with full understanding of what to expect during the session. Before acupuncture began, provider explained to the patient to communicate at any time if the procedure was causing discomfort past their tolerance level. Patient agreed to advise acupuncturist. The acupuncturist counseled the patient on the risks of acupuncture treatment including pain, infection, bleeding, and no relief of pain. The patient was positioned comfortably. There was no evidence of infection at the site of needle insertions.    Objective   Physical Exam              Acupuncture Treatment  Patient Position: Supine on a table  Acupuncture Needling: Yes  Needle Guage: 40 guage /.16/ Red seirin  Body Points: With retention  Body Points -  Left: zigong  Body Points - Bilateral: Du 20, yintang, R 12, ST 25, SP 15, R 6, P 6, ST 36, SP 6  Auricular Points: No  Electroacupuncture Used: No  Other Techniques Utilized: Ear seeds, TDP Lamp  Ear Seeds: Stainless steel (shenmen)  TDP Lamp Descripton: feet and abdomen  Needle Count In: 15  Needle Count Out: 15  Needle Retention Time (min): 25 minutes  Total Face to Face Time (min): 25 minutes              Assessment/Plan   Diagnoses and all orders for this visit:  Neck pain  Upper back pain  Nonintractable headache, unspecified chronicity pattern, unspecified headache type

## 2025-04-04 ENCOUNTER — APPOINTMENT (OUTPATIENT)
Dept: INTEGRATIVE MEDICINE | Facility: CLINIC | Age: 40
End: 2025-04-04
Payer: COMMERCIAL

## 2025-04-04 DIAGNOSIS — R51.9 NONINTRACTABLE HEADACHE, UNSPECIFIED CHRONICITY PATTERN, UNSPECIFIED HEADACHE TYPE: ICD-10-CM

## 2025-04-04 DIAGNOSIS — R00.2 PALPITATIONS: ICD-10-CM

## 2025-04-04 DIAGNOSIS — M54.2 NECK PAIN: Primary | ICD-10-CM

## 2025-04-04 DIAGNOSIS — M54.9 UPPER BACK PAIN: ICD-10-CM

## 2025-04-04 PROCEDURE — 97810 ACUP 1/> WO ESTIM 1ST 15 MIN: CPT | Performed by: ACUPUNCTURIST

## 2025-04-04 PROCEDURE — 97811 ACUP 1/> W/O ESTIM EA ADD 15: CPT | Performed by: ACUPUNCTURIST

## 2025-04-04 NOTE — PROGRESS NOTES
Acupuncture Visit:     Subjective   Patient ID: Elizabeth Barnes is a 40 y.o. female who presents for Neck Pain, Back Pain, and Dysmenorrhea    Neck pain and chronic right shoulder/upper back pain: still some tightness in the neck and back.  Worst in the morning.      Headache: no headaches.    Palpitations: some palpitations before her period.     Supplements: elderberry gummy, multivitamin, magnesium, vitamin D  Medications: levothyroxine        Session Information  Is this acupuncture treatment being billed to the patient's insurance company: Yes  Last Treatment date: 03/21/25  Name of Insurance Company: [employer]  Visit Type: Follow-up visit  Medical History Reviewed: I have reviewed pertinent medical history in EHR, and no contraindications are present to provide treatment         Review of Systems  Sleep: feeling tired because she is not getting enough sleep.      Digestion: some constipation.     Gyne: LMP last weekend.  She had a 25 days cycle. She only had a little cramping with her period and did not need any medication.     Stress: moderate stress       Provider reviewed plan for the acupuncture session, precautions and contraindications. Patient/guardian/hospital staff has given consent to treat with full understanding of what to expect during the session. Before acupuncture began, provider explained to the patient to communicate at any time if the procedure was causing discomfort past their tolerance level. Patient agreed to advise acupuncturist. The acupuncturist counseled the patient on the risks of acupuncture treatment including pain, infection, bleeding, and no relief of pain. The patient was positioned comfortably. There was no evidence of infection at the site of needle insertions.    Objective   Physical Exam              Acupuncture Treatment  Patient Position: Supine on a table  Acupuncture Needling: Yes  Needle Guage: 40 guage /.16/ Red seirin  Body Points: With retention  Body Points -  Bilateral: Du 20, karineg, GB 21, P 6, R 12, SP 15, R 6, ST 36, Sp 6  Auricular Points: No  Electroacupuncture Used: No  Other Techniques Utilized: Ear seeds, TDP Lamp  Ear Seeds: Stainless steel (ruban)  TDP Lamp Descripton: feet and abdomen  Needle Count In: 14  Needle Count Out: 14  Needle Retention Time (min): 25 minutes  Total Face to Face Time (min): 25 minutes              Assessment/Plan   Diagnoses and all orders for this visit:  Neck pain  Upper back pain  Nonintractable headache, unspecified chronicity pattern, unspecified headache type  Palpitations

## 2025-04-09 NOTE — PROGRESS NOTES
Endocrinology  04/10/25      History of Present Illness   Elizabeth Barnes is a 40 y.o. year old female with medical history of hypothyroidism, here for hypothyroidism.      Patient was previously seen by MARK Shah for her hypothyroidism. She was referred to endocrinology for thyroid care following two miscarriages (11/2021 and 5/2022). She was referred to FELICIANO who started her on levothyroxine around June or July 2022 and she was able to carry a baby to term and deliver 4/2023. Levothyroxine initiated at 25 mcg daily and increased to 100 mcg daily by the end of pregnant. At 5W post-partum her TSH was 0.1 and so LT4 reduced to 75 in 5/2023. Her last visit with Padmini was in 9/2023.  She is currently on 50 mcg levothyroxine.     She states that she has been feeling well on the 50 mcg M-Sa and 100 mcg Sunday. No family hx of hypothyroidism.     Plans for family: not planning to have more children     Hypothyroid symptoms  Fatigue: endorses (has 2 small childnen age 6 and 8)  Weakness: denies  Weight gain: all over the place, lost 15 lbs in 11/2024 d/t PNA  Constipation: endorses (helped by acupuncture)  Cold Intolerance: denies  Dry skin: endorses   Hair loss: baseline  Edema: denies  Myalgias: denies  Parasthesias:  deneis    Current TRT: 50 mcg M-Sa + 100 mcg sunday  Ca/Mg/Fe/biotin/PPI: takes Fe and Mg before bed  Taking appropriately: 6:30 AM    Review of Systems   General: Denies fever or chills   Head: Denies headache or vision changes (recently developed vertigo; had one episode in her 20s after a concert)  Neck: Denies tenderness or lumps   Cardiac: Denies chest pain and heart palpitations (started in pregnancy, seen by cardiology; thinks it may be related to elevated progesterone or stress)  Respiratory: Denies shortness of breath or cough   GI: Denies abdominal pain, nausea, or vomiting   Extremities: Denies swelling   Skin: Denies rash     Medications     Current Outpatient Medications    Medication Instructions    levothyroxine (Synthroid, Levoxyl) 50 mcg tablet Take one tablet daily except 2 pills on Sundays.          History   No past medical history on file.    No past surgical history on file.    No family history on file.     No Known Allergies     Social history: denies tobacco, a couple glasses of wine/wk, denies illicits       Physical Exam   /80   Pulse 82   Wt 62.3 kg (137 lb 5.6 oz)   BMI 22.86 kg/m²    Constitutional: She is well-developed, well-nourished, and in no distress.  No hyperactivity observed, no rapid speech.   Head: Normocephalic, Atrautmatic  Mouth/Throat: Oropharynx is clear and moist  Eyes: No lid retraction (stare), no proptosis, no lid lag. Non-icteric.   Neck: Normal range of motion. Neck supple. No thyromegaly present. No nodules palpated.  Cardiovascular: Normal rate, regular rhythm, normal heart sounds and intact distal pulses.   Pulmonary/Chest: Effort normal and breath sounds normal. No respiratory distress.  Musculoskeletal: normal muscle mass, normal muscle tone  Neurological: She is alert. She is not disoriented.  .   Skin: Skin is warm and moist. No pre tibial edema.   Psychiatric: Appropriate mood and affect        Labs and Imaging      Latest Reference Range & Units 07/23/24 16:40 01/14/25 08:39   Thyroxine, Free 0.78 - 1.48 ng/dL  1.16   Thyroid Stimulating Hormone 0.44 - 3.98 mIU/L 3.32 4.10 (H)   (H): Data is abnormally high      Assessment and Plan   Elizabeth Barnes is a 40 y.o. year old female with medical history of hypothyroidism, here for hypothyroidism.     #Hypothyroidism  - Etiology: acquired  - Per chart review TPO negative x2, but not able to find the actual lab report. Will repeat lab for confirmation  - current TRT: 50 mcg M-Sa, 100 mcg Sun; avg daily dose 57 mcg  - Repeat TFTs and adjust dose as needed      RTC: 6M       Viviane Magaña MD   of Medicine  Department of Medicine  Diabetes and Metabolic Care  [facility]

## 2025-04-10 ENCOUNTER — OFFICE VISIT (OUTPATIENT)
Age: 40
End: 2025-04-10
Payer: COMMERCIAL

## 2025-04-10 VITALS
BODY MASS INDEX: 22.86 KG/M2 | SYSTOLIC BLOOD PRESSURE: 120 MMHG | DIASTOLIC BLOOD PRESSURE: 80 MMHG | WEIGHT: 137.35 LBS | HEART RATE: 82 BPM

## 2025-04-10 DIAGNOSIS — E03.9 HYPOTHYROIDISM, UNSPECIFIED TYPE: Primary | ICD-10-CM

## 2025-04-10 PROCEDURE — 1036F TOBACCO NON-USER: CPT | Performed by: STUDENT IN AN ORGANIZED HEALTH CARE EDUCATION/TRAINING PROGRAM

## 2025-04-10 PROCEDURE — G2211 COMPLEX E/M VISIT ADD ON: HCPCS | Performed by: STUDENT IN AN ORGANIZED HEALTH CARE EDUCATION/TRAINING PROGRAM

## 2025-04-10 PROCEDURE — 99204 OFFICE O/P NEW MOD 45 MIN: CPT | Performed by: STUDENT IN AN ORGANIZED HEALTH CARE EDUCATION/TRAINING PROGRAM

## 2025-04-10 PROCEDURE — 99214 OFFICE O/P EST MOD 30 MIN: CPT | Performed by: STUDENT IN AN ORGANIZED HEALTH CARE EDUCATION/TRAINING PROGRAM

## 2025-04-10 RX ORDER — LEVOTHYROXINE SODIUM 50 UG/1
TABLET ORAL
Qty: 90 TABLET | Refills: 0 | Status: SHIPPED | OUTPATIENT
Start: 2025-04-10 | End: 2025-04-11

## 2025-04-10 ASSESSMENT — PATIENT HEALTH QUESTIONNAIRE - PHQ9
SUM OF ALL RESPONSES TO PHQ9 QUESTIONS 1 AND 2: 0
2. FEELING DOWN, DEPRESSED OR HOPELESS: NOT AT ALL
1. LITTLE INTEREST OR PLEASURE IN DOING THINGS: NOT AT ALL

## 2025-04-10 ASSESSMENT — ENCOUNTER SYMPTOMS
OCCASIONAL FEELINGS OF UNSTEADINESS: 0
LOSS OF SENSATION IN FEET: 0
DEPRESSION: 0

## 2025-04-10 NOTE — PATIENT INSTRUCTIONS
After Visit Summary  It was great seeing you today!    In summary from our visit today, I would like to remind you of the following:    - get labs at your convenience    Division of Endocrinology   Follow-up appointments:  Please arrive at least 20 minutes prior to your follow up appointments in order to keep visits as close as possible to the scheduled times. If you need to cancel an appointment, please call at least 24 hours in advance.    Please bring your medications or an updated list of medications to each of your visits to help ensure safety in prescribing future medications.    If you are being seen for diabetes, please bring your glucose meter and/or glucose log with 2 weeks of glucose readings to each visit.    Medication Refills: Please request medication refills at the time of your appointment.   - Refills requested by phone or Snupp[employer]art in between visits require at least 3 business days to be processed.    Lab Results: Please allow at least 7 business days for lab results to be reviewed.  - Please note, that some specialty testing may take up to at least 7 business to be completed.   - If there are any urgent issues requiring immediate attention, we will contact you at your provided phone numbers.   - Any non-urgent results will be relayed via [employer] if you have signed up for this service or via a letter through the mail and/or phone call.     Communication with your provider:  - [facility]uet is highly recommended as the most efficient means to contact your provider. However for urgent concerns please call.   - For phone calls, please call our office Monday- Friday 8am to 4:30pm and your message will be relayed to your provider. Please allows 1-2 business days for a response.  - After hours messages are left with an answering service and will be handled by the clinic the following business day.      Sincerely,   Viviane Magaña MD  Division of Endocrinology  [facility]

## 2025-04-11 ENCOUNTER — APPOINTMENT (OUTPATIENT)
Dept: INTEGRATIVE MEDICINE | Facility: CLINIC | Age: 40
End: 2025-04-11
Payer: COMMERCIAL

## 2025-04-11 DIAGNOSIS — R42 VERTIGO: ICD-10-CM

## 2025-04-11 DIAGNOSIS — M54.2 NECK PAIN: Primary | ICD-10-CM

## 2025-04-11 DIAGNOSIS — R51.9 NONINTRACTABLE HEADACHE, UNSPECIFIED CHRONICITY PATTERN, UNSPECIFIED HEADACHE TYPE: ICD-10-CM

## 2025-04-11 DIAGNOSIS — M54.9 UPPER BACK PAIN: ICD-10-CM

## 2025-04-11 DIAGNOSIS — E03.9 HYPOTHYROIDISM, UNSPECIFIED TYPE: ICD-10-CM

## 2025-04-11 PROCEDURE — 97810 ACUP 1/> WO ESTIM 1ST 15 MIN: CPT | Performed by: ACUPUNCTURIST

## 2025-04-11 PROCEDURE — 97811 ACUP 1/> W/O ESTIM EA ADD 15: CPT | Performed by: ACUPUNCTURIST

## 2025-04-11 RX ORDER — LEVOTHYROXINE SODIUM 50 UG/1
TABLET ORAL
Qty: 90 TABLET | Refills: 3 | Status: SHIPPED | OUTPATIENT
Start: 2025-04-11

## 2025-04-11 NOTE — PROGRESS NOTES
Acupuncture Visit:     Subjective   Patient ID: Elizabeth Barnes is a 40 y.o. female who presents for Neck Pain, Headache, and Vertigo    Vertigo: she had a bout of vertigo this week after traveling on Thursday morning at 5AM.  It only lasted 2-3 minutes.  She is feeling some ear and neck aching since. She had some nausea.  Headache all day yesterday.  She has been feeling off since then.     Neck pain and chronic right shoulder/upper back pain:  she is having a lot of tension and discomfort on the right side.     Headache: yesterday after the vertigo.     Palpitations: no palpitations    Supplements: elderberry gummy, multivitamin, magnesium, vitamin D  Medications: levothyroxine        Session Information  Is this acupuncture treatment being billed to the patient's insurance company: Yes  Last Treatment date: 04/04/25  Name of Insurance Company: [employer]  Visit Type: Follow-up visit  Medical History Reviewed: I have reviewed pertinent medical history in EHR, and no contraindications are present to provide treatment         Review of Systems  Sleep: doing well with sleep    Digestion: some constipation.     Gyne: getting ready to ovulate on CD 14 today.  Feeling cramping.     Stress: moderate stress       Provider reviewed plan for the acupuncture session, precautions and contraindications. Patient/guardian/hospital staff has given consent to treat with full understanding of what to expect during the session. Before acupuncture began, provider explained to the patient to communicate at any time if the procedure was causing discomfort past their tolerance level. Patient agreed to advise acupuncturist. The acupuncturist counseled the patient on the risks of acupuncture treatment including pain, infection, bleeding, and no relief of pain. The patient was positioned comfortably. There was no evidence of infection at the site of needle insertions.    Objective   Physical Exam              Acupuncture Treatment  Patient  Position: Supine on a table  Acupuncture Needling: Yes  Needle Guage: 40 guage /.16/ Red seirin  Body Points: With retention  Body Points - Bilateral: Du 20, yintang, GB 8, LI 4, SJ 5, R 12, ST 25, R 6, zigong, GB 34, SP 6  Auricular Points: No  Electroacupuncture Used: No  Other Techniques Utilized: Ear seeds, TDP Lamp  Ear Seeds: Stainless steel (shenmen)  TDP Lamp Descripton: feet and abdomen  Needle Count In: 18  Needle Count Out: 18  Needle Retention Time (min): 25 minutes  Total Face to Face Time (min): 25 minutes              Assessment/Plan   Diagnoses and all orders for this visit:  Neck pain  Upper back pain  Nonintractable headache, unspecified chronicity pattern, unspecified headache type  Vertigo

## 2025-04-12 LAB
T4 FREE SERPL-MCNC: 1.1 NG/DL (ref 0.8–1.8)
THYROPEROXIDASE AB SERPL-ACNC: <1 IU/ML
TSH SERPL-ACNC: 2.44 MIU/L

## 2025-04-18 ENCOUNTER — APPOINTMENT (OUTPATIENT)
Dept: INTEGRATIVE MEDICINE | Facility: CLINIC | Age: 40
End: 2025-04-18
Payer: COMMERCIAL

## 2025-04-18 DIAGNOSIS — M54.9 UPPER BACK PAIN: ICD-10-CM

## 2025-04-18 DIAGNOSIS — R00.2 PALPITATIONS: ICD-10-CM

## 2025-04-18 DIAGNOSIS — N94.6 DYSMENORRHEA: ICD-10-CM

## 2025-04-18 DIAGNOSIS — R51.9 NONINTRACTABLE HEADACHE, UNSPECIFIED CHRONICITY PATTERN, UNSPECIFIED HEADACHE TYPE: ICD-10-CM

## 2025-04-18 DIAGNOSIS — M54.2 NECK PAIN: Primary | ICD-10-CM

## 2025-04-18 PROCEDURE — 97810 ACUP 1/> WO ESTIM 1ST 15 MIN: CPT | Performed by: ACUPUNCTURIST

## 2025-04-18 PROCEDURE — 97811 ACUP 1/> W/O ESTIM EA ADD 15: CPT | Performed by: ACUPUNCTURIST

## 2025-04-18 NOTE — PROGRESS NOTES
Acupuncture Visit:     Subjective   Patient ID: Elizabeth Barnes is a 40 y.o. female who presents for Neck Pain, Headache, and Dysmenorrhea    Vertigo: she has not had any dizziness this week but she is still having some discomfort in her neck along the jawline.  She thinks it might be allergy related and it gets better when she takes allergy medicine.  She is planning to see her doctor next week.    Neck pain and chronic right shoulder/upper back pain:  less pain in the neck and back after the last treatment. Mild tension.    Headache: no headaches this week.     Palpitations: a little bit of skipping but she is getting close to her period.     Supplements: elderberry gummy, multivitamin, magnesium, vitamin D  Medications: levothyroxine        Session Information  Is this acupuncture treatment being billed to the patient's insurance company: Yes  Last Treatment date: 04/11/25  Name of Insurance Company: [employer]  Visit Type: Follow-up visit  Medical History Reviewed: I have reviewed pertinent medical history in EHR, and no contraindications are present to provide treatment         Review of Systems  Sleep: not sleeping as well due to travel.     Digestion: she was having constipation but improving now. Tending toward constipation.    Gyne: She is mid-luteal phase.     Stress: stress have been higher       Provider reviewed plan for the acupuncture session, precautions and contraindications. Patient/guardian/hospital staff has given consent to treat with full understanding of what to expect during the session. Before acupuncture began, provider explained to the patient to communicate at any time if the procedure was causing discomfort past their tolerance level. Patient agreed to advise acupuncturist. The acupuncturist counseled the patient on the risks of acupuncture treatment including pain, infection, bleeding, and no relief of pain. The patient was positioned comfortably. There was no evidence of infection at the  site of needle insertions.    Objective   Physical Exam              Acupuncture Treatment  Patient Position: Supine on a table  Acupuncture Needling: Yes  Needle Guage: 40 guage /.16/ Red seirin  Body Points: With retention  Body Points - Bilateral: Du 20, yintang, GB 21, P 6, R 12, SP 15, R 6, ST 36, SP 6  Auricular Points: No  Electroacupuncture Used: No  Other Techniques Utilized: Ear seeds, TDP Lamp  Ear Seeds: Stainless steel (shenmen)  TDP Lamp Descripton: feet and abdomen  Needle Count In: 14  Needle Count Out: 14  Needle Retention Time (min): 25 minutes  Total Face to Face Time (min): 25 minutes              Assessment/Plan   Diagnoses and all orders for this visit:  Neck pain  Upper back pain  Nonintractable headache, unspecified chronicity pattern, unspecified headache type  Palpitations  Dysmenorrhea

## 2025-04-25 ENCOUNTER — APPOINTMENT (OUTPATIENT)
Dept: INTEGRATIVE MEDICINE | Facility: CLINIC | Age: 40
End: 2025-04-25
Payer: COMMERCIAL

## 2025-04-25 DIAGNOSIS — M54.2 NECK PAIN: Primary | ICD-10-CM

## 2025-04-25 DIAGNOSIS — R00.2 PALPITATIONS: ICD-10-CM

## 2025-04-25 DIAGNOSIS — R51.9 NONINTRACTABLE HEADACHE, UNSPECIFIED CHRONICITY PATTERN, UNSPECIFIED HEADACHE TYPE: ICD-10-CM

## 2025-04-25 DIAGNOSIS — N94.6 DYSMENORRHEA: ICD-10-CM

## 2025-04-25 DIAGNOSIS — M54.9 UPPER BACK PAIN: ICD-10-CM

## 2025-04-25 PROCEDURE — 97810 ACUP 1/> WO ESTIM 1ST 15 MIN: CPT | Performed by: ACUPUNCTURIST

## 2025-04-25 PROCEDURE — 97811 ACUP 1/> W/O ESTIM EA ADD 15: CPT | Performed by: ACUPUNCTURIST

## 2025-04-25 NOTE — PROGRESS NOTES
Acupuncture Visit:     Subjective   Patient ID: Elizabeth Barnes is a 40 y.o. female who presents for Neck Pain, Headache, and Dysmenorrhea    Vertigo:  she has not been getting any more dizziness.  She saw her doctor and her doctor was not concerned about the vertigo episode.  Since she is feeling some swelling and discomfort in the neck along the jaw line, her doctor ordered an ultrasound to get a closer look.     Neck pain and chronic right shoulder/upper back pain: overall feeling mild tension but no pain this week. Preparing for vacation tomorrow.    Headache: no headaches this week.     Palpitations: her period started today and she had a little bit of palpitations leading up to the period.    Supplements: elderberry gummy, multivitamin, magnesium, vitamin D  Medications: levothyroxine        Session Information  Is this acupuncture treatment being billed to the patient's insurance company: Yes  Last Treatment date: 04/18/25  Name of Insurance Company: [employer]  Visit Type: Follow-up visit  Medical History Reviewed: I have reviewed pertinent medical history in EHR, and no contraindications are present to provide treatment         Review of Systems  Sleep: doing better overall.     Digestion: mild constipation before period and then looser stool with period.     Gyne: Period started yesterday.  She is having some cramping today and needed Advil.     Stress: stress higher this week.        Provider reviewed plan for the acupuncture session, precautions and contraindications. Patient/guardian/hospital staff has given consent to treat with full understanding of what to expect during the session. Before acupuncture began, provider explained to the patient to communicate at any time if the procedure was causing discomfort past their tolerance level. Patient agreed to advise acupuncturist. The acupuncturist counseled the patient on the risks of acupuncture treatment including pain, infection, bleeding, and no relief  Continue Crestor 5 mg.  Will check lipid panel now and adjust the dose as needed   of pain. The patient was positioned comfortably. There was no evidence of infection at the site of needle insertions.    Objective   Physical Exam              Acupuncture Treatment  Patient Position: Supine on a table  Acupuncture Needling: Yes  Needle Guage: 40 guage /.16/ Red seirin  Body Points: With retention  Body Points - Bilateral: Du 20, yintang, P 6, GB 21, R 12, ST 25, R 6, zigong, ST 36, Sp 6  Auricular Points: No  Electroacupuncture Used: No  Other Techniques Utilized: Ear seeds, TDP Lamp  Ear Seeds: Stainless steel (shenmen)  TDP Lamp Descripton: feet and abdomen  Needle Count In: 16  Needle Count Out: 16  Needle Retention Time (min): 25 minutes  Total Face to Face Time (min): 25 minutes              Assessment/Plan   Diagnoses and all orders for this visit:  Neck pain  Upper back pain  Nonintractable headache, unspecified chronicity pattern, unspecified headache type  Dysmenorrhea  Palpitations

## 2025-05-09 ENCOUNTER — APPOINTMENT (OUTPATIENT)
Dept: INTEGRATIVE MEDICINE | Facility: CLINIC | Age: 40
End: 2025-05-09
Payer: COMMERCIAL

## 2025-05-09 DIAGNOSIS — R51.9 NONINTRACTABLE HEADACHE, UNSPECIFIED CHRONICITY PATTERN, UNSPECIFIED HEADACHE TYPE: ICD-10-CM

## 2025-05-09 DIAGNOSIS — M54.2 NECK PAIN: Primary | ICD-10-CM

## 2025-05-09 DIAGNOSIS — J02.9 SORE THROAT: ICD-10-CM

## 2025-05-09 DIAGNOSIS — M54.9 UPPER BACK PAIN: ICD-10-CM

## 2025-05-09 PROCEDURE — 97811 ACUP 1/> W/O ESTIM EA ADD 15: CPT | Performed by: ACUPUNCTURIST

## 2025-05-09 PROCEDURE — 97810 ACUP 1/> WO ESTIM 1ST 15 MIN: CPT | Performed by: ACUPUNCTURIST

## 2025-05-09 NOTE — PROGRESS NOTES
Acupuncture Visit:     Subjective   Patient ID: Elizabeth Barnes is a 40 y.o. female who presents for Headache, Neck Pain, and URI    She felt great on vacation but got a cold this week. Sore throat and mild sinus congestion. No fever.    Neck pain and chronic right shoulder/upper back pain: neck and upper back are doing okay this week. She had a vacation with less stress and less work at the computer. She had an ultrasound of her neck and the area of concern was normal. There are two small lesions on the parotid glad that she will discuss with her doctor but the radiologist recommended follow up in 1 year.    Headache: some increase in headaches this week with the changes in weather.      Palpitations: no palpitations but she has been getting pain in the left side of her lower chest below the breast. Possibly related to lifting weights.  She feels a sharp pain when she takes a deep breath in.    Supplements: elderberry gummy, multivitamin, magnesium, vitamin D  Medications: levothyroxine        Session Information  Is this acupuncture treatment being billed to the patient's insurance company: Yes  Last Treatment date: 04/25/25  Name of Insurance Company: [employer]  Visit Type: Follow-up visit  Medical History Reviewed: I have reviewed pertinent medical history in EHR, and no contraindications are present to provide treatment         Review of Systems  Sleep: good overall.  Great sleep on vacation.    Digestion: mild constipation during travel but better this week.     Gyne: She ovulated on CD 13 - with pain.  Today is CD 17.    Stress: moderate       Provider reviewed plan for the acupuncture session, precautions and contraindications. Patient/guardian/hospital staff has given consent to treat with full understanding of what to expect during the session. Before acupuncture began, provider explained to the patient to communicate at any time if the procedure was causing discomfort past their tolerance level. Patient  agreed to advise acupuncturist. The acupuncturist counseled the patient on the risks of acupuncture treatment including pain, infection, bleeding, and no relief of pain. The patient was positioned comfortably. There was no evidence of infection at the site of needle insertions.    Objective   Physical Exam              Acupuncture Treatment  Patient Position: Supine on a table  Acupuncture Needling: Yes  Needle Guage: 36 guage /.20/ Blue seirin  Body Points: With retention  Body Points - Bilateral: Du 20, yintang, DANIAL 14, DANIAL 14 (5E location), R 12, ST 25, R 6, P 6, LI 4, ST 36  Auricular Points: No  Electroacupuncture Used: No  Other Techniques Utilized: Ear seeds, TDP Lamp  Ear Seeds: Stainless steel (shenmen)  TDP Lamp Descripton: feet and abdomen  Needle Count In: 16  Needle Count Out: 16  Needle Retention Time (min): 25 minutes  Total Face to Face Time (min): 25 minutes              Assessment/Plan   Diagnoses and all orders for this visit:  Neck pain  Upper back pain  Nonintractable headache, unspecified chronicity pattern, unspecified headache type  Sore throat

## 2025-05-16 ENCOUNTER — APPOINTMENT (OUTPATIENT)
Dept: INTEGRATIVE MEDICINE | Facility: CLINIC | Age: 40
End: 2025-05-16
Payer: COMMERCIAL

## 2025-05-16 DIAGNOSIS — N94.6 DYSMENORRHEA: ICD-10-CM

## 2025-05-16 DIAGNOSIS — R51.9 NONINTRACTABLE HEADACHE, UNSPECIFIED CHRONICITY PATTERN, UNSPECIFIED HEADACHE TYPE: ICD-10-CM

## 2025-05-16 DIAGNOSIS — M54.9 UPPER BACK PAIN: ICD-10-CM

## 2025-05-16 DIAGNOSIS — M54.2 NECK PAIN: Primary | ICD-10-CM

## 2025-05-16 PROCEDURE — 97810 ACUP 1/> WO ESTIM 1ST 15 MIN: CPT | Performed by: ACUPUNCTURIST

## 2025-05-16 PROCEDURE — 97811 ACUP 1/> W/O ESTIM EA ADD 15: CPT | Performed by: ACUPUNCTURIST

## 2025-05-16 NOTE — PROGRESS NOTES
Acupuncture Visit:     Subjective   Patient ID: Elizabeth Barnes is a 40 y.o. female who presents for Neck Pain, Headache, Stress, and Dysmenorrhea    She has had a high stress week but her body is handling it well compared to before acupuncture.     Neck pain and chronic right shoulder/upper back pain: she had a really achy neck when she was sick but it has been improving since her recovery this week.      Headache: no headaches despite increased stress this week.    Palpitations: she is in the luteal phase but not feeling palpitations this month like in the past.     Supplements: elderberry gummy, multivitamin, magnesium, vitamin D  Medications: levothyroxine        Session Information  Is this acupuncture treatment being billed to the patient's insurance company: Yes  Last Treatment date: 05/09/25  Name of Insurance Company: [employer]  Visit Type: Follow-up visit  Medical History Reviewed: I have reviewed pertinent medical history in EHR, and no contraindications are present to provide treatment         Review of Systems  Sleep: sleeping okay but not as restful     Digestion: normal.     Gyne: She is expecting her period soon but no cramping yet.    Stress: much higher this week.        Provider reviewed plan for the acupuncture session, precautions and contraindications. Patient/guardian/hospital staff has given consent to treat with full understanding of what to expect during the session. Before acupuncture began, provider explained to the patient to communicate at any time if the procedure was causing discomfort past their tolerance level. Patient agreed to advise acupuncturist. The acupuncturist counseled the patient on the risks of acupuncture treatment including pain, infection, bleeding, and no relief of pain. The patient was positioned comfortably. There was no evidence of infection at the site of needle insertions.    Objective   Physical Exam              Acupuncture Treatment  Patient Position: Supine on  a table  Acupuncture Needling: Yes  Needle Guage: 40 guage /.16/ Red seirin  Body Points: With retention  Body Points - Bilateral: yintang, Du 20, R 12, SP 15, R 6, zigong, P 6, HT 7, ST 36, SP 6  Auricular Points: No  Electroacupuncture Used: No  Other Techniques Utilized: Ear seeds, TDP Lamp, Cupping  Cupping Description: running cupping on left upper thigh  Ear Seeds: Stainless steel (shenmen)  TDP Lamp Descripton: feet and abdomen  Needle Count In: 16  Needle Count Out: 16  Needle Retention Time (min): 25 minutes  Total Face to Face Time (min): 25 minutes              Assessment/Plan   Diagnoses and all orders for this visit:  Neck pain  Upper back pain  Nonintractable headache, unspecified chronicity pattern, unspecified headache type  Dysmenorrhea

## 2025-05-23 ENCOUNTER — ALLIED HEALTH (OUTPATIENT)
Dept: INTEGRATIVE MEDICINE | Facility: CLINIC | Age: 40
End: 2025-05-23
Payer: COMMERCIAL

## 2025-05-23 ENCOUNTER — APPOINTMENT (OUTPATIENT)
Dept: INTEGRATIVE MEDICINE | Facility: CLINIC | Age: 40
End: 2025-05-23
Payer: COMMERCIAL

## 2025-05-23 DIAGNOSIS — M54.9 UPPER BACK PAIN: ICD-10-CM

## 2025-05-23 DIAGNOSIS — M54.2 NECK PAIN: Primary | ICD-10-CM

## 2025-05-23 DIAGNOSIS — R51.9 NONINTRACTABLE HEADACHE, UNSPECIFIED CHRONICITY PATTERN, UNSPECIFIED HEADACHE TYPE: ICD-10-CM

## 2025-05-23 PROCEDURE — 97811 ACUP 1/> W/O ESTIM EA ADD 15: CPT | Performed by: ACUPUNCTURIST

## 2025-05-23 PROCEDURE — 97810 ACUP 1/> WO ESTIM 1ST 15 MIN: CPT | Performed by: ACUPUNCTURIST

## 2025-05-23 NOTE — PROGRESS NOTES
Acupuncture Visit:     Subjective   Patient ID: Elizabeth Barnes is a 40 y.o. female who presents for Headache and Neck Pain    Neck pain and chronic right shoulder/upper back pain: she had a really achy neck when she was sick but it has been improving since her recovery this week.      Headache: some headaches this week from travel and the weather and drinking less water.     Palpitations: no palpitations.     Supplements: elderberry gummy, multivitamin, magnesium, vitamin D  Medications: levothyroxine        Session Information  Is this acupuncture treatment being billed to the patient's insurance company: Yes  Last Treatment date: 05/16/25  Name of Insurance Company: [employer]  Visit Type: Follow-up visit  Medical History Reviewed: I have reviewed pertinent medical history in EHR, and no contraindications are present to provide treatment         Review of Systems  Sleep: doing well this week    Digestion: BM normal.    Gyne: Her cramping was not as bad this cycle.  She is on CD 7 or 8.     Stress: much higher this week.        Provider reviewed plan for the acupuncture session, precautions and contraindications. Patient/guardian/hospital staff has given consent to treat with full understanding of what to expect during the session. Before acupuncture began, provider explained to the patient to communicate at any time if the procedure was causing discomfort past their tolerance level. Patient agreed to advise acupuncturist. The acupuncturist counseled the patient on the risks of acupuncture treatment including pain, infection, bleeding, and no relief of pain. The patient was positioned comfortably. There was no evidence of infection at the site of needle insertions.    Objective   Physical Exam              Acupuncture Treatment  Patient Position: Supine on a table  Acupuncture Needling: Yes  Needle Guage: 40 guage /.16/ Red seirin  Body Points: With retention  Body Points - Bilateral: Du 20, yintang, LI 4, P 6, R  12, ST 25, SP 15, R 6, ST 36, KD 3, DANIAL 3  Auricular Points: No  Electroacupuncture Used: No  Other Techniques Utilized: Ear seeds, TDP Lamp  Ear Seeds: Stainless steel (shenmen)  TDP Lamp Descripton: feet and abdomen  Needle Count In: 18  Needle Count Out: 18  Needle Retention Time (min): 25 minutes  Total Face to Face Time (min): 25 minutes              Assessment/Plan   Diagnoses and all orders for this visit:  Neck pain  Upper back pain  Nonintractable headache, unspecified chronicity pattern, unspecified headache type

## 2025-05-30 ENCOUNTER — APPOINTMENT (OUTPATIENT)
Dept: INTEGRATIVE MEDICINE | Facility: CLINIC | Age: 40
End: 2025-05-30
Payer: COMMERCIAL

## 2025-05-30 DIAGNOSIS — M54.9 UPPER BACK PAIN: ICD-10-CM

## 2025-05-30 DIAGNOSIS — N94.6 DYSMENORRHEA: ICD-10-CM

## 2025-05-30 DIAGNOSIS — M54.2 NECK PAIN: Primary | ICD-10-CM

## 2025-05-30 DIAGNOSIS — R51.9 NONINTRACTABLE HEADACHE, UNSPECIFIED CHRONICITY PATTERN, UNSPECIFIED HEADACHE TYPE: ICD-10-CM

## 2025-05-30 PROCEDURE — 97810 ACUP 1/> WO ESTIM 1ST 15 MIN: CPT | Performed by: ACUPUNCTURIST

## 2025-05-30 PROCEDURE — 97811 ACUP 1/> W/O ESTIM EA ADD 15: CPT | Performed by: ACUPUNCTURIST

## 2025-05-30 NOTE — PROGRESS NOTES
Acupuncture Visit:     Subjective   Patient ID: Elizabeth Barnes is a 40 y.o. female who presents for Neck Pain, Back Pain, Headache, and Dysmenorrhea    Neck pain and chronic right shoulder/upper back pain: seeing improvement in her neck and upper back over last week.  She has been getting good sleep and stress has been less.  She is also exercising regularly.  All of that has been helpful.    Headache: she had a little bit of headaches with the bad weather this week.     Dysmenorrhea: She ovulated on Wednesday night and had about 8 hours of high pain, which was shorter than usual.     Palpitations: no palpitations.     Supplements: elderberry gummy, multivitamin, magnesium, vitamin D  Medications: levothyroxine        Session Information  Is this acupuncture treatment being billed to the patient's insurance company: Yes  Last Treatment date: 05/23/25  Name of Insurance Company: [employer]  Visit Type: Follow-up visit  Medical History Reviewed: I have reviewed pertinent medical history in EHR, and no contraindications are present to provide treatment         Review of Systems  Sleep: doing well this week    Digestion: BM normal.    Stress: a little lower this week.        Provider reviewed plan for the acupuncture session, precautions and contraindications. Patient/guardian/hospital staff has given consent to treat with full understanding of what to expect during the session. Before acupuncture began, provider explained to the patient to communicate at any time if the procedure was causing discomfort past their tolerance level. Patient agreed to advise acupuncturist. The acupuncturist counseled the patient on the risks of acupuncture treatment including pain, infection, bleeding, and no relief of pain. The patient was positioned comfortably. There was no evidence of infection at the site of needle insertions.    Objective   Physical Exam              Acupuncture Treatment  Patient Position: Supine on a  table  Acupuncture Needling: Yes  Needle Guage: 40 guage /.16/ Red seirin  Body Points: With retention  Body Points - Bilateral: Du 20, yintang, P 6, R 12, ST 25, R 6, ST 36, SP 6, GB 21  Auricular Points: No  Electroacupuncture Used: No  Other Techniques Utilized: Ear seeds, TDP Lamp  Ear Seeds: Stainless steel (shenmen)  TDP Lamp Descripton: feet and abdomen  Needle Count In: 14  Needle Count Out: 14  Needle Retention Time (min): 25 minutes  Total Face to Face Time (min): 25 minutes              Assessment/Plan   Diagnoses and all orders for this visit:  Neck pain  Upper back pain  Nonintractable headache, unspecified chronicity pattern, unspecified headache type  Dysmenorrhea

## 2025-06-06 ENCOUNTER — APPOINTMENT (OUTPATIENT)
Dept: INTEGRATIVE MEDICINE | Facility: CLINIC | Age: 40
End: 2025-06-06
Payer: COMMERCIAL

## 2025-06-06 DIAGNOSIS — R51.9 NONINTRACTABLE HEADACHE, UNSPECIFIED CHRONICITY PATTERN, UNSPECIFIED HEADACHE TYPE: ICD-10-CM

## 2025-06-06 DIAGNOSIS — N94.6 DYSMENORRHEA: ICD-10-CM

## 2025-06-06 DIAGNOSIS — M54.2 NECK PAIN: Primary | ICD-10-CM

## 2025-06-06 DIAGNOSIS — M54.9 UPPER BACK PAIN: ICD-10-CM

## 2025-06-06 PROCEDURE — 97811 ACUP 1/> W/O ESTIM EA ADD 15: CPT | Performed by: ACUPUNCTURIST

## 2025-06-06 PROCEDURE — 97810 ACUP 1/> WO ESTIM 1ST 15 MIN: CPT | Performed by: ACUPUNCTURIST

## 2025-06-13 ENCOUNTER — APPOINTMENT (OUTPATIENT)
Dept: INTEGRATIVE MEDICINE | Facility: CLINIC | Age: 40
End: 2025-06-13
Payer: COMMERCIAL

## 2025-06-13 DIAGNOSIS — M54.9 UPPER BACK PAIN: ICD-10-CM

## 2025-06-13 DIAGNOSIS — N94.6 DYSMENORRHEA: ICD-10-CM

## 2025-06-13 DIAGNOSIS — R51.9 NONINTRACTABLE HEADACHE, UNSPECIFIED CHRONICITY PATTERN, UNSPECIFIED HEADACHE TYPE: ICD-10-CM

## 2025-06-13 DIAGNOSIS — M54.2 NECK PAIN: Primary | ICD-10-CM

## 2025-06-13 PROCEDURE — 97810 ACUP 1/> WO ESTIM 1ST 15 MIN: CPT | Performed by: ACUPUNCTURIST

## 2025-06-13 PROCEDURE — 97811 ACUP 1/> W/O ESTIM EA ADD 15: CPT | Performed by: ACUPUNCTURIST

## 2025-06-13 NOTE — PROGRESS NOTES
Acupuncture Visit:     Subjective   Patient ID: Elizabeth Barnes is a 40 y.o. female who presents for Neck Pain, Headache, and Dysmenorrhea    Neck pain and chronic right shoulder/upper back pain: she is noticing more tightness and pain in the neck and upper back this week. She would like to treat with cupping today.    Headache: she had a really bad headache on the right side of her head/temple this week.  Doing okay today.    Dysmenorrhea:  LMP 6/12/25 She is having cramping but it is mild to moderate.  Needed Advil last night but nothing since then.  She cramps when her bleeding starts.  Some clots.     Palpitations: More palpitations this past week.  She is thinks it might be the hormonal changes before her period.     Supplements: elderberry gummy, multivitamin, magnesium, vitamin D  Medications: levothyroxine        Session Information  Is this acupuncture treatment being billed to the patient's insurance company: Yes  Last Treatment date: 06/06/25  Name of Insurance Company: [employer]  Visit Type: Follow-up visit  Medical History Reviewed: I have reviewed pertinent medical history in EHR, and no contraindications are present to provide treatment         Review of Systems  Sleep: has been good this week.     Digestion: BM normal.    Stress: she is feeling less stressed.        Provider reviewed plan for the acupuncture session, precautions and contraindications. Patient/guardian/hospital staff has given consent to treat with full understanding of what to expect during the session. Before acupuncture began, provider explained to the patient to communicate at any time if the procedure was causing discomfort past their tolerance level. Patient agreed to advise acupuncturist. The acupuncturist counseled the patient on the risks of acupuncture treatment including pain, infection, bleeding, and no relief of pain. The patient was positioned comfortably. There was no evidence of infection at the site of needle  insertions.    Objective   Physical Exam              Acupuncture Treatment  Patient Position: Supine on a table  Acupuncture Needling: Yes  Needle Guage: 40 guage /.16/ Red seirin  Body Points: With retention  Body Points - Bilateral: Du 20, yintang, P 6, LI 4, GB 21, R 12, SP 15, R 6, zigong, ST 36, SP 6  Auricular Points: No  Electroacupuncture Used: No  Other Techniques Utilized: Ear seeds, TDP Lamp, Cupping  Cupping Description: running cupping on upper back with massage oil  Ear Seeds: Stainless steel (shenmen)  TDP Lamp Descripton: feet and abdomen  Needle Count In: 18  Needle Count Out: 18  Needle Retention Time (min): 25 minutes  Total Face to Face Time (min): 25 minutes              Assessment/Plan   Diagnoses and all orders for this visit:  Neck pain  Upper back pain  Nonintractable headache, unspecified chronicity pattern, unspecified headache type  Dysmenorrhea

## 2025-06-20 ENCOUNTER — APPOINTMENT (OUTPATIENT)
Dept: INTEGRATIVE MEDICINE | Facility: CLINIC | Age: 40
End: 2025-06-20
Payer: COMMERCIAL

## 2025-06-20 DIAGNOSIS — M54.2 NECK PAIN: Primary | ICD-10-CM

## 2025-06-20 DIAGNOSIS — N94.6 DYSMENORRHEA: ICD-10-CM

## 2025-06-20 DIAGNOSIS — R51.9 NONINTRACTABLE HEADACHE, UNSPECIFIED CHRONICITY PATTERN, UNSPECIFIED HEADACHE TYPE: ICD-10-CM

## 2025-06-20 DIAGNOSIS — M54.9 UPPER BACK PAIN: ICD-10-CM

## 2025-06-20 PROCEDURE — 97810 ACUP 1/> WO ESTIM 1ST 15 MIN: CPT | Performed by: ACUPUNCTURIST

## 2025-06-20 PROCEDURE — 97811 ACUP 1/> W/O ESTIM EA ADD 15: CPT | Performed by: ACUPUNCTURIST

## 2025-06-20 NOTE — PROGRESS NOTES
Acupuncture Visit:     Subjective   Patient ID: Elizabeth Barnes is a 40 y.o. female who presents for Neck Pain, Headache, and Dysmenorrhea    Neck pain and chronic right shoulder/upper back pain: still getting more pain in the right neck and upper back this week.  She would like to address with cupping again.      Headache: no headaches this week.     Dysmenorrhea:  LMP 6/12/25 No issues currently.  Today is CD 9 and starting to feel some ovulation pain.     Palpitations: better this week.     Supplements: elderberry gummy, multivitamin, magnesium, vitamin D  Medications: levothyroxine        Session Information  Is this acupuncture treatment being billed to the patient's insurance company: Yes  Last Treatment date: 06/13/25  Name of Insurance Company: [employer]  Visit Type: Follow-up visit  Medical History Reviewed: I have reviewed pertinent medical history in EHR, and no contraindications are present to provide treatment         Review of Systems  Sleep: has been good this week.     Digestion: BM normal.    Stress: she is feeling more wired this week.  A little anxiety.        Provider reviewed plan for the acupuncture session, precautions and contraindications. Patient/guardian/hospital staff has given consent to treat with full understanding of what to expect during the session. Before acupuncture began, provider explained to the patient to communicate at any time if the procedure was causing discomfort past their tolerance level. Patient agreed to advise acupuncturist. The acupuncturist counseled the patient on the risks of acupuncture treatment including pain, infection, bleeding, and no relief of pain. The patient was positioned comfortably. There was no evidence of infection at the site of needle insertions.    Objective   Physical Exam              Acupuncture Treatment  Patient Position: Supine on a table  Acupuncture Needling: Yes  Needle Guage: 40 guage /.16/ Red seirin  Body Points: With retention  Body  Points - Bilateral: Du 20, yintang, P 6, R 12, ST 25, R 6, zigong, ST 36, Sp 6  Auricular Points: No  Electroacupuncture Used: No  Other Techniques Utilized: Ear seeds, TDP Lamp  Ear Seeds: Stainless steel (urban)  TDP Lamp Descripton: feet and abdomen  Needle Count In: 14  Needle Count Out: 14  Needle Retention Time (min): 25 minutes  Total Face to Face Time (min): 25 minutes              Assessment/Plan   Diagnoses and all orders for this visit:  Neck pain  Upper back pain  Nonintractable headache, unspecified chronicity pattern, unspecified headache type  Dysmenorrhea

## 2025-06-23 ENCOUNTER — APPOINTMENT (OUTPATIENT)
Dept: INTEGRATIVE MEDICINE | Facility: CLINIC | Age: 40
End: 2025-06-23
Payer: COMMERCIAL

## 2025-06-23 PROCEDURE — MASSG60 MASSAGE 60 MINUTES: Performed by: MASSAGE THERAPIST

## 2025-06-23 NOTE — PROGRESS NOTES
Massage Therapy Visit:     Elizabeth Barnes was self-referred.    Condition of Client Subjective :  Patient ID: Elizabeth Barnes is a 40 y.o. female who presents for reason for visit of Muscle tension release.  HPI    Session Information  Description of present complaint: Stress, Muscle tension, Range of motion (ROM), Discomfort    Review of Systems    Objective   Pre-treatment Assessment  Condition of Client Note: Tightness, discomfort, felt on right shoulder girdle.  Overall muscle tightness from weight lifting, strength training.    Physical Exam    Actions Assessment/Plan :    Massage Treatment  Patient Position: Table, Supine  Positioning Assistance: Did not need assistance, Pillow(s)/bolster under knees while supine  Massage Technique: Therapeutic massage, Superficial fascial release, Stretching, Myofascial release, Soft tissue mobilization, Relaxation massage  Area/Body Region: Whole body  Pressure Scale: 2 - Mild pressure, 3 - Medium pressure, 4 - Moderate pressure  Action Note: Upper body, kneading, stripping, palming, effleurage, petrissage, cross fiber, on Cervicals, Occipitals, Temporalis, Platysma, SCM, SITS, Levator Scapulae, Trapezius, Rhomboids, upper Pectoralis, Subscapularis, Latissimus, Teres Major, Deltoids.  UE, stretching, cross fiber, palming, kneading, effleurage, on Deltoids, Biceps, Triceps, forearm muscles, hand muscles.  LE, kneading, stripping, palming, knuckle, effleurage, petrissage, cross fiber, stretching, on Glutues group, Pirifoprmis, IT bands, Hamstrings, Vastus group, TFL, Sartorius, Gracilis, Gastrocnemius, Soleus, Tibialis, plantar/dorsal aspect of foot.    Response:  Post-treatment Assessment  Patient Noted Improvement of the Following Symptoms: ROM, Muscle tension    Evaluation:   Massage Therapy Evaluation / Recommendation(s) / Follow-up  Post-Treatment Recommendation: Increase hydration, stretching  Follow-up: Follow up scheduled

## 2025-07-01 ENCOUNTER — APPOINTMENT (OUTPATIENT)
Dept: INTEGRATIVE MEDICINE | Facility: CLINIC | Age: 40
End: 2025-07-01
Payer: COMMERCIAL

## 2025-07-01 DIAGNOSIS — M54.9 UPPER BACK PAIN: ICD-10-CM

## 2025-07-01 DIAGNOSIS — R51.9 NONINTRACTABLE HEADACHE, UNSPECIFIED CHRONICITY PATTERN, UNSPECIFIED HEADACHE TYPE: ICD-10-CM

## 2025-07-01 DIAGNOSIS — N94.6 DYSMENORRHEA: ICD-10-CM

## 2025-07-01 DIAGNOSIS — M54.2 NECK PAIN: Primary | ICD-10-CM

## 2025-07-01 PROCEDURE — 97810 ACUP 1/> WO ESTIM 1ST 15 MIN: CPT | Performed by: ACUPUNCTURIST

## 2025-07-01 PROCEDURE — 97811 ACUP 1/> W/O ESTIM EA ADD 15: CPT | Performed by: ACUPUNCTURIST

## 2025-07-01 NOTE — PROGRESS NOTES
Acupuncture Visit:     Subjective   Patient ID: Elizabeth Barnes is a 40 y.o. female who presents for Neck Pain, Headache, and Dysmenorrhea    Neck pain and chronic right shoulder/upper back pain: she had a massage last week.  Some neck tightness this week. She is having a lot of tightness in her left chest from holding her daughter. She gets pain shooting into her bicep.     Headache: no headaches this week.     Dysmenorrhea:  LMP 6/12/25 She is about CD 20.  She has had a lot of ovarian pain starting from CD 12.  She is getting pain only on the left.  On CD 17 she woke with a lot of pain on the left side that could have been related to her ovary or a pulled muscle or constipation.     Palpitations: mild palpitations    Supplements: elderberry gummy, multivitamin, magnesium, vitamin D  Medications: levothyroxine        Session Information  Is this acupuncture treatment being billed to the patient's insurance company: Yes  Last Treatment date: 06/20/25  Name of Insurance Company: [employer]  Visit Type: Follow-up visit  Medical History Reviewed: I have reviewed pertinent medical history in EHR, and no contraindications are present to provide treatment         Review of Systems  Sleep: no issues.     Digestion: BM constipation.    Stress: less stress lately.        Provider reviewed plan for the acupuncture session, precautions and contraindications. Patient/guardian/hospital staff has given consent to treat with full understanding of what to expect during the session. Before acupuncture began, provider explained to the patient to communicate at any time if the procedure was causing discomfort past their tolerance level. Patient agreed to advise acupuncturist. The acupuncturist counseled the patient on the risks of acupuncture treatment including pain, infection, bleeding, and no relief of pain. The patient was positioned comfortably. There was no evidence of infection at the site of needle insertions.    Objective    Physical Exam              Acupuncture Treatment  Patient Position: Supine on a table  Acupuncture Needling: Yes  Needle Guage: 36 guage /.20/ Blue seirin  Body Points: With retention  Body Points - Left: KD 24, FRED 1, jiadevonqian  Body Points - Bilateral: yintang, GB 21, P 6, DANIAL 14, R 12, SP 15, R 6, zigong, ST 36, SP 6  Auricular Points: No  Electroacupuncture Used: No  Other Techniques Utilized: Ear seeds, TDP Lamp  Ear Seeds: Stainless steel (shenmen)  TDP Lamp Descripton: feet and abdomen  Needle Count In: 20  Needle Count Out: 20  Needle Retention Time (min): 25 minutes  Total Face to Face Time (min): 25 minutes              Assessment/Plan   Diagnoses and all orders for this visit:  Neck pain  Upper back pain  Nonintractable headache, unspecified chronicity pattern, unspecified headache type  Dysmenorrhea

## 2025-07-03 ENCOUNTER — APPOINTMENT (OUTPATIENT)
Dept: INTEGRATIVE MEDICINE | Facility: CLINIC | Age: 40
End: 2025-07-03
Payer: COMMERCIAL

## 2025-07-11 ENCOUNTER — APPOINTMENT (OUTPATIENT)
Dept: INTEGRATIVE MEDICINE | Facility: CLINIC | Age: 40
End: 2025-07-11
Payer: COMMERCIAL

## 2025-07-11 DIAGNOSIS — R51.9 NONINTRACTABLE HEADACHE, UNSPECIFIED CHRONICITY PATTERN, UNSPECIFIED HEADACHE TYPE: ICD-10-CM

## 2025-07-11 DIAGNOSIS — R68.84 JAW PAIN: ICD-10-CM

## 2025-07-11 DIAGNOSIS — M54.2 NECK PAIN: Primary | ICD-10-CM

## 2025-07-11 DIAGNOSIS — M54.9 UPPER BACK PAIN: ICD-10-CM

## 2025-07-11 DIAGNOSIS — N94.6 DYSMENORRHEA: ICD-10-CM

## 2025-07-11 PROCEDURE — 97810 ACUP 1/> WO ESTIM 1ST 15 MIN: CPT | Performed by: ACUPUNCTURIST

## 2025-07-11 PROCEDURE — 97811 ACUP 1/> W/O ESTIM EA ADD 15: CPT | Performed by: ACUPUNCTURIST

## 2025-07-11 NOTE — PROGRESS NOTES
Acupuncture Visit:     Subjective   Patient ID: Elizabeth Barnes is a 40 y.o. female who presents for Neck Pain, Back Pain, Jaw Pain, Headache, and Dysmenorrhea    Neck pain and chronic right shoulder/upper back pain: her neck and upper back are having less pain since the last acupuncture treatment but she is noticing some jaw pain this week on the left side of her face.     Headache: no headaches this week.     Dysmenorrhea:  LMP 6/12/25 LMP 7/5/25  Today is CD 7.  Her period came on CD 24.  It was a short period and she only needed Advil on one day so overall better.    Palpitations: no issues with palpitations.     Supplements: elderberry gummy, multivitamin, magnesium, vitamin D  Medications: levothyroxine        Session Information  Is this acupuncture treatment being billed to the patient's insurance company: Yes  Last Treatment date: 07/01/25  Name of Insurance Company: [employer]  Visit Type: Follow-up visit  Medical History Reviewed: I have reviewed pertinent medical history in EHR, and no contraindications are present to provide treatment         Review of Systems  Sleep: no issues.     Digestion: BM less constipation.    Stress: moderate stress this week with travel delays        Provider reviewed plan for the acupuncture session, precautions and contraindications. Patient/guardian/hospital staff has given consent to treat with full understanding of what to expect during the session. Before acupuncture began, provider explained to the patient to communicate at any time if the procedure was causing discomfort past their tolerance level. Patient agreed to advise acupuncturist. The acupuncturist counseled the patient on the risks of acupuncture treatment including pain, infection, bleeding, and no relief of pain. The patient was positioned comfortably. There was no evidence of infection at the site of needle insertions.    Objective   Physical Exam              Acupuncture Treatment  Patient Position: Supine on  a table  Acupuncture Needling: Yes  Needle Guage: 40 guage /.16/ Red seirin  Body Points: With retention  Body Points - Left: ST 7, ST 6  Body Points - Bilateral: Du 20, yintang, R 12, ST 25, R 6, zigong, P 6, ST 36, SP 6, GB 21  Auricular Points: No  Electroacupuncture Used: No  Other Techniques Utilized: Ear seeds, TDP Lamp  Ear Seeds: Stainless steel (shenmen)  TDP Lamp Descripton: feet and abdomen  Needle Count In: 16  Needle Count Out: 16  Needle Retention Time (min): 30 minutes  Total Face to Face Time (min): 25 minutes              Assessment/Plan   Diagnoses and all orders for this visit:  Neck pain  Upper back pain  Jaw pain  Nonintractable headache, unspecified chronicity pattern, unspecified headache type  Dysmenorrhea

## 2025-07-18 ENCOUNTER — APPOINTMENT (OUTPATIENT)
Dept: INTEGRATIVE MEDICINE | Facility: CLINIC | Age: 40
End: 2025-07-18
Payer: COMMERCIAL

## 2025-07-18 DIAGNOSIS — R00.2 PALPITATIONS: ICD-10-CM

## 2025-07-18 DIAGNOSIS — M54.9 UPPER BACK PAIN: ICD-10-CM

## 2025-07-18 DIAGNOSIS — N94.6 DYSMENORRHEA: ICD-10-CM

## 2025-07-18 DIAGNOSIS — R51.9 NONINTRACTABLE HEADACHE, UNSPECIFIED CHRONICITY PATTERN, UNSPECIFIED HEADACHE TYPE: ICD-10-CM

## 2025-07-18 DIAGNOSIS — M54.2 NECK PAIN: Primary | ICD-10-CM

## 2025-07-18 PROCEDURE — 97810 ACUP 1/> WO ESTIM 1ST 15 MIN: CPT | Performed by: ACUPUNCTURIST

## 2025-07-18 PROCEDURE — 97811 ACUP 1/> W/O ESTIM EA ADD 15: CPT | Performed by: ACUPUNCTURIST

## 2025-07-18 NOTE — PROGRESS NOTES
Acupuncture Visit:     Subjective   Patient ID: Elizabeth Barnes is a 40 y.o. female who presents for Neck Pain, Headache, Dysmenorrhea, and Palpitations    Neck pain and chronic right shoulder/upper back pain: doing better this week.  She has been stretching more and that is helpful.     Headache: she has had a lot of headaches this week.  She was traveling all week and thinks that it might be dehydration.     Dysmenorrhea:  LMP 6/12/25 LMP 7/5/25  Today is CD 14  She thinks that she ovulated early on CD 7 - she had a lot of pain that day and then her temps sheri 2 days later.    Palpitations: she had some palpitations this week.     Supplements: elderberry gummy, multivitamin, magnesium, vitamin D  Medications: levothyroxine        Session Information  Is this acupuncture treatment being billed to the patient's insurance company: Yes  Last Treatment date: 07/11/25  Name of Insurance Company: [employer]  Visit Type: Follow-up visit  Medical History Reviewed: I have reviewed pertinent medical history in EHR, and no contraindications are present to provide treatment         Review of Systems  Sleep: doing well.     Digestion: BM less constipation.    Stress: higher stress this week with travel delays        Provider reviewed plan for the acupuncture session, precautions and contraindications. Patient/guardian/hospital staff has given consent to treat with full understanding of what to expect during the session. Before acupuncture began, provider explained to the patient to communicate at any time if the procedure was causing discomfort past their tolerance level. Patient agreed to advise acupuncturist. The acupuncturist counseled the patient on the risks of acupuncture treatment including pain, infection, bleeding, and no relief of pain. The patient was positioned comfortably. There was no evidence of infection at the site of needle insertions.    Objective   Physical Exam              Acupuncture Treatment  Patient  Position: Supine on a table  Acupuncture Needling: Yes  Needle Guage: 40 guage /.16/ Red seirin  Body Points: With retention  Body Points - Bilateral: Du 20, yintang, P 6, R 12, ST 25, SP 15, R 6, GB 21, ST 36, SP 6  Auricular Points: No  Electroacupuncture Used: No  Other Techniques Utilized: Ear seeds, TDP Lamp  Ear Seeds: Stainless steel  TDP Lamp Descripton: feet and abdomen  Needle Count In: 16  Needle Count Out: 16  Needle Retention Time (min): 25 minutes  Total Face to Face Time (min): 25 minutes              Assessment/Plan   Diagnoses and all orders for this visit:  Neck pain  Upper back pain  Nonintractable headache, unspecified chronicity pattern, unspecified headache type  Dysmenorrhea  Palpitations

## 2025-07-23 ENCOUNTER — APPOINTMENT (OUTPATIENT)
Dept: ENDOCRINOLOGY | Facility: CLINIC | Age: 40
End: 2025-07-23
Payer: COMMERCIAL

## 2025-08-01 ENCOUNTER — APPOINTMENT (OUTPATIENT)
Dept: INTEGRATIVE MEDICINE | Facility: CLINIC | Age: 40
End: 2025-08-01
Payer: COMMERCIAL

## 2025-08-01 DIAGNOSIS — N94.6 DYSMENORRHEA: ICD-10-CM

## 2025-08-01 DIAGNOSIS — R51.9 NONINTRACTABLE HEADACHE, UNSPECIFIED CHRONICITY PATTERN, UNSPECIFIED HEADACHE TYPE: ICD-10-CM

## 2025-08-01 DIAGNOSIS — R68.84 JAW PAIN: ICD-10-CM

## 2025-08-01 DIAGNOSIS — M54.2 NECK PAIN: Primary | ICD-10-CM

## 2025-08-01 PROCEDURE — 97810 ACUP 1/> WO ESTIM 1ST 15 MIN: CPT | Performed by: ACUPUNCTURIST

## 2025-08-01 PROCEDURE — 97811 ACUP 1/> W/O ESTIM EA ADD 15: CPT | Performed by: ACUPUNCTURIST

## 2025-08-01 NOTE — PROGRESS NOTES
Acupuncture Visit:     Subjective   Patient ID: Elizabeth Barnes is a 40 y.o. female who presents for Neck Pain, Jaw Pain, Headache, and Dysmenorrhea    Neck pain and chronic right shoulder/upper back pain and jaw pain: she is having more tension in her neck and jaw this week.  She thinks it is stress and possibly allergies causing swelling in the front of the neck and jaw. She felt that it improved in [place] while on vacation but now this week she is feeling pain again.  She is going to see her doctor at the end of August.    Headache: no headaches this week. Doing better.     Dysmenorrhea:   7/5/25 LMP 7/23/25 Today is CD 10. She had a period on CD 19 of her last cycle after ovulating on CD 7. She had a very light period with not a lot of cramps.  No PMS. Doing well now.  No signs of ovulation yet this cycle.    Palpitations: no heart palpitations.  She saw her cardiologist this week and everything looked good.     Supplements: elderberry gummy, multivitamin, magnesium, vitamin D  Medications: levothyroxine        Session Information  Is this acupuncture treatment being billed to the patient's insurance company: Yes  Last Treatment date: 07/18/25  Name of Insurance Company: [employer]  Visit Type: Follow-up visit  Medical History Reviewed: I have reviewed pertinent medical history in EHR, and no contraindications are present to provide treatment         Review of Systems  Sleep: doing well.     Digestion: BM less constipation.    Stress: moderate stress this week       Provider reviewed plan for the acupuncture session, precautions and contraindications. Patient/guardian/hospital staff has given consent to treat with full understanding of what to expect during the session. Before acupuncture began, provider explained to the patient to communicate at any time if the procedure was causing discomfort past their tolerance level. Patient agreed to advise acupuncturist. The acupuncturist counseled the patient on  the risks of acupuncture treatment including pain, infection, bleeding, and no relief of pain. The patient was positioned comfortably. There was no evidence of infection at the site of needle insertions.    Objective   Physical Exam              Acupuncture Treatment  Patient Position: Supine on a table  Acupuncture Needling: Yes  Needle Guage: 40 guage /.16/ Red seirin  Body Points: With retention  Body Points - Bilateral: Du 20, ST 6, ST 7, P 6, R 12, ST 25, SP 15, R 6, ST 36, Sp 6, zigong  Auricular Points: No  Other Techniques Utilized: Ear seeds, TDP Lamp  Ear Seeds: Stainless steel (shenmen)  TDP Lamp Descripton: feet and abdomen  Needle Count In: 19  Needle Count Out: 19  Needle Retention Time (min): 30 minutes  Total Face to Face Time (min): 25 minutes              Assessment/Plan   Diagnoses and all orders for this visit:  Neck pain  Jaw pain  Nonintractable headache, unspecified chronicity pattern, unspecified headache type  Dysmenorrhea

## 2025-08-08 ENCOUNTER — ALLIED HEALTH (OUTPATIENT)
Dept: INTEGRATIVE MEDICINE | Facility: CLINIC | Age: 40
End: 2025-08-08
Payer: COMMERCIAL

## 2025-08-08 ENCOUNTER — APPOINTMENT (OUTPATIENT)
Dept: INTEGRATIVE MEDICINE | Facility: CLINIC | Age: 40
End: 2025-08-08
Payer: COMMERCIAL

## 2025-08-08 DIAGNOSIS — M54.9 UPPER BACK PAIN: ICD-10-CM

## 2025-08-08 DIAGNOSIS — R51.9 NONINTRACTABLE HEADACHE, UNSPECIFIED CHRONICITY PATTERN, UNSPECIFIED HEADACHE TYPE: ICD-10-CM

## 2025-08-08 DIAGNOSIS — N94.6 DYSMENORRHEA: ICD-10-CM

## 2025-08-08 DIAGNOSIS — M54.2 NECK PAIN: Primary | ICD-10-CM

## 2025-08-08 DIAGNOSIS — R68.84 JAW PAIN: ICD-10-CM

## 2025-08-08 PROCEDURE — 97810 ACUP 1/> WO ESTIM 1ST 15 MIN: CPT | Performed by: ACUPUNCTURIST

## 2025-08-08 PROCEDURE — 97811 ACUP 1/> W/O ESTIM EA ADD 15: CPT | Performed by: ACUPUNCTURIST

## 2025-08-12 ENCOUNTER — ALLIED HEALTH (OUTPATIENT)
Dept: INTEGRATIVE MEDICINE | Facility: CLINIC | Age: 40
End: 2025-08-12

## 2025-08-12 PROCEDURE — MASSG60 MASSAGE 60 MINUTES: Performed by: MASSAGE THERAPIST

## 2025-08-12 PROCEDURE — CYTAX SALES TAX CUYAHOGA COUNT: Performed by: MASSAGE THERAPIST

## 2025-08-15 ENCOUNTER — APPOINTMENT (OUTPATIENT)
Dept: INTEGRATIVE MEDICINE | Facility: CLINIC | Age: 40
End: 2025-08-15
Payer: COMMERCIAL

## 2025-08-22 ENCOUNTER — APPOINTMENT (OUTPATIENT)
Dept: INTEGRATIVE MEDICINE | Facility: CLINIC | Age: 40
End: 2025-08-22
Payer: COMMERCIAL

## 2025-08-29 ENCOUNTER — APPOINTMENT (OUTPATIENT)
Dept: INTEGRATIVE MEDICINE | Facility: CLINIC | Age: 40
End: 2025-08-29
Payer: COMMERCIAL

## 2025-09-05 ENCOUNTER — APPOINTMENT (OUTPATIENT)
Dept: INTEGRATIVE MEDICINE | Facility: CLINIC | Age: 40
End: 2025-09-05
Payer: COMMERCIAL

## 2025-09-12 ENCOUNTER — APPOINTMENT (OUTPATIENT)
Dept: INTEGRATIVE MEDICINE | Facility: CLINIC | Age: 40
End: 2025-09-12
Payer: COMMERCIAL

## 2025-09-19 ENCOUNTER — APPOINTMENT (OUTPATIENT)
Dept: INTEGRATIVE MEDICINE | Facility: CLINIC | Age: 40
End: 2025-09-19
Payer: COMMERCIAL

## 2025-09-26 ENCOUNTER — APPOINTMENT (OUTPATIENT)
Dept: INTEGRATIVE MEDICINE | Facility: CLINIC | Age: 40
End: 2025-09-26
Payer: COMMERCIAL